# Patient Record
Sex: FEMALE | Race: WHITE | NOT HISPANIC OR LATINO | Employment: OTHER | URBAN - METROPOLITAN AREA
[De-identification: names, ages, dates, MRNs, and addresses within clinical notes are randomized per-mention and may not be internally consistent; named-entity substitution may affect disease eponyms.]

---

## 2023-05-25 LAB
ANION GAP IN SER/PLAS: 12 MMOL/L (ref 10–20)
CALCIUM (MG/DL) IN SER/PLAS: 8.6 MG/DL (ref 8.6–10.3)
CARBON DIOXIDE, TOTAL (MMOL/L) IN SER/PLAS: 28 MMOL/L (ref 21–32)
CHLORIDE (MMOL/L) IN SER/PLAS: 98 MMOL/L (ref 98–107)
CREATININE (MG/DL) IN SER/PLAS: 2.09 MG/DL (ref 0.5–1.05)
GFR FEMALE: 30 ML/MIN/1.73M2
GLUCOSE (MG/DL) IN SER/PLAS: 449 MG/DL (ref 74–99)
POTASSIUM (MMOL/L) IN SER/PLAS: 4.1 MMOL/L (ref 3.5–5.3)
SODIUM (MMOL/L) IN SER/PLAS: 134 MMOL/L (ref 136–145)
UREA NITROGEN (MG/DL) IN SER/PLAS: 15 MG/DL (ref 6–23)

## 2023-06-13 ENCOUNTER — HOSPITAL ENCOUNTER (OUTPATIENT)
Dept: DATA CONVERSION | Facility: HOSPITAL | Age: 43
End: 2023-06-13
Attending: SURGERY | Admitting: SURGERY
Payer: COMMERCIAL

## 2023-06-13 DIAGNOSIS — E55.9 VITAMIN D DEFICIENCY, UNSPECIFIED: ICD-10-CM

## 2023-06-13 DIAGNOSIS — N18.6 END STAGE RENAL DISEASE (MULTI): ICD-10-CM

## 2023-06-13 DIAGNOSIS — Z87.81 PERSONAL HISTORY OF (HEALED) TRAUMATIC FRACTURE: ICD-10-CM

## 2023-06-13 DIAGNOSIS — Z87.891 PERSONAL HISTORY OF NICOTINE DEPENDENCE: ICD-10-CM

## 2023-06-13 DIAGNOSIS — E10.21 TYPE 1 DIABETES MELLITUS WITH DIABETIC NEPHROPATHY (MULTI): ICD-10-CM

## 2023-06-13 DIAGNOSIS — Z99.2 DEPENDENCE ON RENAL DIALYSIS (CMS-HCC): ICD-10-CM

## 2023-06-13 DIAGNOSIS — I70.25 ATHEROSCLEROSIS OF NATIVE ARTERIES OF OTHER EXTREMITIES WITH ULCERATION (MULTI): ICD-10-CM

## 2023-06-13 DIAGNOSIS — Z90.49 ACQUIRED ABSENCE OF OTHER SPECIFIED PARTS OF DIGESTIVE TRACT: ICD-10-CM

## 2023-06-13 DIAGNOSIS — E10.22 TYPE 1 DIABETES MELLITUS WITH DIABETIC CHRONIC KIDNEY DISEASE (MULTI): ICD-10-CM

## 2023-06-13 DIAGNOSIS — M86.9 OSTEOMYELITIS, UNSPECIFIED (MULTI): ICD-10-CM

## 2023-06-13 DIAGNOSIS — E10.40 TYPE 1 DIABETES MELLITUS WITH DIABETIC NEUROPATHY, UNSPECIFIED (MULTI): ICD-10-CM

## 2023-06-13 DIAGNOSIS — I13.11 HYPERTENSIVE HEART AND CHRONIC KIDNEY DISEASE WITHOUT HEART FAILURE, WITH STAGE 5 CHRONIC KIDNEY DISEASE, OR END STAGE RENAL DISEASE (MULTI): ICD-10-CM

## 2023-06-13 DIAGNOSIS — E87.6 HYPOKALEMIA: ICD-10-CM

## 2023-06-13 DIAGNOSIS — Z95.820 PERIPHERAL VASCULAR ANGIOPLASTY STATUS WITH IMPLANTS AND GRAFTS: ICD-10-CM

## 2023-06-13 DIAGNOSIS — D69.6 THROMBOCYTOPENIA, UNSPECIFIED (CMS-HCC): ICD-10-CM

## 2023-06-13 DIAGNOSIS — Z86.718 PERSONAL HISTORY OF OTHER VENOUS THROMBOSIS AND EMBOLISM: ICD-10-CM

## 2023-06-13 DIAGNOSIS — Z79.84 LONG TERM (CURRENT) USE OF ORAL HYPOGLYCEMIC DRUGS: ICD-10-CM

## 2023-06-13 DIAGNOSIS — K21.9 GASTRO-ESOPHAGEAL REFLUX DISEASE WITHOUT ESOPHAGITIS: ICD-10-CM

## 2023-06-13 DIAGNOSIS — D63.1 ANEMIA IN CHRONIC KIDNEY DISEASE (CODE): ICD-10-CM

## 2023-06-13 DIAGNOSIS — E78.5 HYPERLIPIDEMIA, UNSPECIFIED: ICD-10-CM

## 2023-06-13 DIAGNOSIS — Z79.4 LONG TERM (CURRENT) USE OF INSULIN (MULTI): ICD-10-CM

## 2023-06-13 LAB — POCT GLUCOSE: 139 MG/DL (ref 74–99)

## 2023-07-24 LAB
ANION GAP IN SER/PLAS: 11 MMOL/L (ref 10–20)
CALCIUM (MG/DL) IN SER/PLAS: 9.3 MG/DL (ref 8.6–10.3)
CARBON DIOXIDE, TOTAL (MMOL/L) IN SER/PLAS: 29 MMOL/L (ref 21–32)
CHLORIDE (MMOL/L) IN SER/PLAS: 99 MMOL/L (ref 98–107)
CREATININE (MG/DL) IN SER/PLAS: 2.58 MG/DL (ref 0.5–1.05)
GFR FEMALE: 23 ML/MIN/1.73M2
GLUCOSE (MG/DL) IN SER/PLAS: 265 MG/DL (ref 74–99)
POTASSIUM (MMOL/L) IN SER/PLAS: 3.5 MMOL/L (ref 3.5–5.3)
SODIUM (MMOL/L) IN SER/PLAS: 135 MMOL/L (ref 136–145)
UREA NITROGEN (MG/DL) IN SER/PLAS: 15 MG/DL (ref 6–23)

## 2023-07-26 LAB
BLOOD UREA NITROGEN: 15 MG/DL (ref 6–23)
BLOOD UREA NITROGEN: NORMAL
COLLECTION DURATION OF URINE: 24 HR
COLLECTION DURATION OF URINE: NORMAL
CREATININE (MG/24HR) IN 24 HOUR URINE: 0.71 G/24H (ref 0.67–1.59)
CREATININE (MG/24HR) IN 24 HOUR URINE: NORMAL
CREATININE (MG/DL) IN URINE: 74.7 MG/DL (ref 20–320)
CREATININE (MG/DL) IN URINE: NORMAL
Lab: 9 ML/MIN (ref 70–125)
Lab: NORMAL
Lab: NORMAL G/G CREAT
UREA NITROGEN (G/24 HR) 24 HOUR URINE: 2 G/24H (ref 7–20)
UREA NITROGEN (G/24 HR) 24 HOUR URINE: NORMAL
UREA NITROGEN (MG/DL) IN URINE: 208 MG/DL
UREA NITROGEN (MG/DL) IN URINE: NORMAL
VOLUME OF URINE: 950 ML
VOLUME OF URINE: NORMAL

## 2023-07-27 LAB
CYSTATIN C: 2.63 MG/L (ref 0.63–1.03)
EGFR BY CYSTATIN C: 21 ML/MIN/BSA

## 2023-08-01 PROBLEM — S92.919A TOE FRACTURE: Status: ACTIVE | Noted: 2023-08-01

## 2023-08-01 PROBLEM — I70.229 CRITICAL LOWER LIMB ISCHEMIA (MULTI): Status: ACTIVE | Noted: 2023-08-01

## 2023-08-01 PROBLEM — L03.119 CELLULITIS OF AXILLA: Status: ACTIVE | Noted: 2023-08-01

## 2023-08-01 PROBLEM — R60.0 LOWER EXTREMITY EDEMA: Status: ACTIVE | Noted: 2023-08-01

## 2023-08-01 PROBLEM — E53.8 FOLATE DEFICIENCY: Status: ACTIVE | Noted: 2023-08-01

## 2023-08-01 PROBLEM — E78.5 DYSLIPIDEMIA: Status: ACTIVE | Noted: 2023-08-01

## 2023-08-01 PROBLEM — S42.202A CLOSED FRACTURE OF LEFT PROXIMAL HUMERUS: Status: ACTIVE | Noted: 2023-08-01

## 2023-08-01 PROBLEM — M54.9 CHRONIC BACK PAIN: Status: ACTIVE | Noted: 2023-08-01

## 2023-08-01 PROBLEM — R93.1 AGATSTON CORONARY ARTERY CALCIUM SCORE LESS THAN 100: Status: ACTIVE | Noted: 2023-08-01

## 2023-08-01 PROBLEM — E10.9 TYPE 1 DIABETES MELLITUS (MULTI): Status: ACTIVE | Noted: 2023-08-01

## 2023-08-01 PROBLEM — M25.521 RIGHT ELBOW PAIN: Status: ACTIVE | Noted: 2023-08-01

## 2023-08-01 PROBLEM — D63.1 ANEMIA IN STAGE 5 CHRONIC KIDNEY DISEASE, NOT ON CHRONIC DIALYSIS (MULTI): Status: ACTIVE | Noted: 2023-08-01

## 2023-08-01 PROBLEM — E10.65 UNCONTROLLED TYPE 1 DIABETES MELLITUS WITH HYPERGLYCEMIA (MULTI): Status: ACTIVE | Noted: 2023-08-01

## 2023-08-01 PROBLEM — L03.119 CELLULITIS, LEG: Status: ACTIVE | Noted: 2023-08-01

## 2023-08-01 PROBLEM — I73.9 INTERMITTENT CLAUDICATION (CMS-HCC): Status: ACTIVE | Noted: 2023-08-01

## 2023-08-01 PROBLEM — E10.649 HYPOGLYCEMIA DUE TO TYPE 1 DIABETES MELLITUS (MULTI): Status: ACTIVE | Noted: 2023-08-01

## 2023-08-01 PROBLEM — I77.0 AVF (ARTERIOVENOUS FISTULA) (CMS-HCC): Status: ACTIVE | Noted: 2023-08-01

## 2023-08-01 PROBLEM — S42.409A HUMERUS DISTAL FRACTURE: Status: ACTIVE | Noted: 2023-08-01

## 2023-08-01 PROBLEM — E55.9 VITAMIN D DEFICIENCY: Status: ACTIVE | Noted: 2023-08-01

## 2023-08-01 PROBLEM — M86.10 OSTEOMYELITIS, ACUTE (MULTI): Status: ACTIVE | Noted: 2023-08-01

## 2023-08-01 PROBLEM — N91.2 AMENORRHEA: Status: ACTIVE | Noted: 2023-08-01

## 2023-08-01 PROBLEM — E11.40 DIABETIC NEUROPATHY (MULTI): Status: ACTIVE | Noted: 2023-08-01
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
## 2023-08-01 PROBLEM — R80.9: Status: ACTIVE | Noted: 2023-08-01

## 2023-08-01 PROBLEM — Z99.2 ESRD (END STAGE RENAL DISEASE) ON DIALYSIS (MULTI): Status: ACTIVE | Noted: 2023-08-01

## 2023-08-01 PROBLEM — N18.4 CHRONIC KIDNEY DISEASE, STAGE IV (SEVERE) (MULTI): Status: ACTIVE | Noted: 2023-08-01

## 2023-08-01 PROBLEM — I73.9 PERIPHERAL ARTERY DISEASE (CMS-HCC): Status: ACTIVE | Noted: 2023-08-01

## 2023-08-01 PROBLEM — K21.9 ACID REFLUX: Status: ACTIVE | Noted: 2023-08-01

## 2023-08-01 PROBLEM — N18.5 ANEMIA IN STAGE 5 CHRONIC KIDNEY DISEASE, NOT ON CHRONIC DIALYSIS (MULTI): Status: ACTIVE | Noted: 2023-08-01

## 2023-08-01 PROBLEM — I10 BENIGN ESSENTIAL HYPERTENSION: Status: ACTIVE | Noted: 2023-08-01

## 2023-08-01 PROBLEM — T82.868A: Status: ACTIVE | Noted: 2023-08-01

## 2023-08-01 PROBLEM — N18.6 ESRD (END STAGE RENAL DISEASE) ON DIALYSIS (MULTI): Status: ACTIVE | Noted: 2023-08-01

## 2023-08-01 PROBLEM — G89.29 CHRONIC BACK PAIN: Status: ACTIVE | Noted: 2023-08-01

## 2023-08-01 PROBLEM — E87.6 HYPOKALEMIA: Status: ACTIVE | Noted: 2023-08-01

## 2023-08-01 PROBLEM — R06.09 DOE (DYSPNEA ON EXERTION): Status: ACTIVE | Noted: 2023-08-01

## 2023-08-01 PROBLEM — R19.7 DIARRHEA: Status: ACTIVE | Noted: 2023-08-01

## 2023-09-07 VITALS
BODY MASS INDEX: 28.93 KG/M2 | SYSTOLIC BLOOD PRESSURE: 132 MMHG | DIASTOLIC BLOOD PRESSURE: 72 MMHG | RESPIRATION RATE: 18 BRPM | HEIGHT: 61 IN | TEMPERATURE: 97.7 F | WEIGHT: 153.22 LBS | HEART RATE: 69 BPM

## 2023-09-19 ENCOUNTER — LAB (OUTPATIENT)
Dept: LAB | Facility: LAB | Age: 43
End: 2023-09-19
Payer: COMMERCIAL

## 2023-09-19 ENCOUNTER — OFFICE VISIT (OUTPATIENT)
Dept: PRIMARY CARE | Facility: CLINIC | Age: 43
End: 2023-09-19
Payer: COMMERCIAL

## 2023-09-19 VITALS
SYSTOLIC BLOOD PRESSURE: 158 MMHG | HEIGHT: 67 IN | DIASTOLIC BLOOD PRESSURE: 90 MMHG | WEIGHT: 158 LBS | BODY MASS INDEX: 24.8 KG/M2

## 2023-09-19 DIAGNOSIS — K59.1 FUNCTIONAL DIARRHEA: Primary | ICD-10-CM

## 2023-09-19 DIAGNOSIS — A08.11 ACUTE GASTROENTEROPATHY DUE TO NORWALK AGENT: ICD-10-CM

## 2023-09-19 DIAGNOSIS — K59.1 FUNCTIONAL DIARRHEA: ICD-10-CM

## 2023-09-19 LAB
ALANINE AMINOTRANSFERASE (SGPT) (U/L) IN SER/PLAS: 14 U/L (ref 7–45)
ALBUMIN (G/DL) IN SER/PLAS: 3.7 G/DL (ref 3.4–5)
ALKALINE PHOSPHATASE (U/L) IN SER/PLAS: 136 U/L (ref 33–110)
ANION GAP IN SER/PLAS: 15 MMOL/L (ref 10–20)
ASPARTATE AMINOTRANSFERASE (SGOT) (U/L) IN SER/PLAS: 29 U/L (ref 9–39)
BILIRUBIN TOTAL (MG/DL) IN SER/PLAS: 0.4 MG/DL (ref 0–1.2)
C REACTIVE PROTEIN (MG/L) IN SER/PLAS: 0.39 MG/DL
CALCIUM (MG/DL) IN SER/PLAS: 9 MG/DL (ref 8.6–10.6)
CARBON DIOXIDE, TOTAL (MMOL/L) IN SER/PLAS: 25 MMOL/L (ref 21–32)
CHLORIDE (MMOL/L) IN SER/PLAS: 105 MMOL/L (ref 98–107)
CREATININE (MG/DL) IN SER/PLAS: 2.1 MG/DL (ref 0.5–1.05)
GFR FEMALE: 29 ML/MIN/1.73M2
GLUCOSE (MG/DL) IN SER/PLAS: 86 MG/DL (ref 74–99)
MAGNESIUM (MG/DL) IN SER/PLAS: 2.92 MG/DL (ref 1.6–2.4)
POTASSIUM (MMOL/L) IN SER/PLAS: 4.5 MMOL/L (ref 3.5–5.3)
PROTEIN TOTAL: 6.5 G/DL (ref 6.4–8.2)
SEDIMENTATION RATE, ERYTHROCYTE: 10 MM/H (ref 0–20)
SODIUM (MMOL/L) IN SER/PLAS: 140 MMOL/L (ref 136–145)
UREA NITROGEN (MG/DL) IN SER/PLAS: 17 MG/DL (ref 6–23)

## 2023-09-19 PROCEDURE — 99214 OFFICE O/P EST MOD 30 MIN: CPT | Performed by: STUDENT IN AN ORGANIZED HEALTH CARE EDUCATION/TRAINING PROGRAM

## 2023-09-19 PROCEDURE — 36415 COLL VENOUS BLD VENIPUNCTURE: CPT

## 2023-09-19 PROCEDURE — 3061F NEG MICROALBUMINURIA REV: CPT | Performed by: STUDENT IN AN ORGANIZED HEALTH CARE EDUCATION/TRAINING PROGRAM

## 2023-09-19 PROCEDURE — 3066F NEPHROPATHY DOC TX: CPT | Performed by: STUDENT IN AN ORGANIZED HEALTH CARE EDUCATION/TRAINING PROGRAM

## 2023-09-19 PROCEDURE — 86140 C-REACTIVE PROTEIN: CPT

## 2023-09-19 PROCEDURE — 85652 RBC SED RATE AUTOMATED: CPT

## 2023-09-19 PROCEDURE — 83735 ASSAY OF MAGNESIUM: CPT

## 2023-09-19 PROCEDURE — 3080F DIAST BP >= 90 MM HG: CPT | Performed by: STUDENT IN AN ORGANIZED HEALTH CARE EDUCATION/TRAINING PROGRAM

## 2023-09-19 PROCEDURE — 3077F SYST BP >= 140 MM HG: CPT | Performed by: STUDENT IN AN ORGANIZED HEALTH CARE EDUCATION/TRAINING PROGRAM

## 2023-09-19 PROCEDURE — 3044F HG A1C LEVEL LT 7.0%: CPT | Performed by: STUDENT IN AN ORGANIZED HEALTH CARE EDUCATION/TRAINING PROGRAM

## 2023-09-19 PROCEDURE — 80053 COMPREHEN METABOLIC PANEL: CPT

## 2023-09-19 ASSESSMENT — ENCOUNTER SYMPTOMS
DIARRHEA: 1
CONSTITUTIONAL NEGATIVE: 1
RESPIRATORY NEGATIVE: 1
NEUROLOGICAL NEGATIVE: 1
PSYCHIATRIC NEGATIVE: 1
ABDOMINAL PAIN: 1
CARDIOVASCULAR NEGATIVE: 1
MUSCULOSKELETAL NEGATIVE: 1

## 2023-09-19 NOTE — PROGRESS NOTES
"Subjective   Patient ID: Lien Moy is a 43 y.o. female who presents for GI Problem (Loose stools, x 1 month).    Patient seen on sick visit.  She regards that she recently went on a vacation, and was recently pulled off dialysis.  Since this time, she has been having episodes of diffuse amounts of diarrhea.  States that it is watery in nature, and can be up to 4-5-6 times a day.  She reports no smell to the diarrhea, no recent changes of her medications.  And can come and go.  Was previously recommended a colonoscopy as well as a EGD which she did not follow-up with.  States no additional issues or concerns.         Review of Systems   Constitutional: Negative.    HENT: Negative.     Respiratory: Negative.     Cardiovascular: Negative.    Gastrointestinal:  Positive for abdominal pain and diarrhea.   Musculoskeletal: Negative.    Neurological: Negative.    Psychiatric/Behavioral: Negative.         Objective   /90   Ht 1.702 m (5' 7\")   Wt 71.7 kg (158 lb)   BMI 24.75 kg/m²     Physical Exam  Constitutional:       General: She is not in acute distress.     Appearance: She is not ill-appearing.   HENT:      Mouth/Throat:      Mouth: Mucous membranes are moist.      Pharynx: Oropharynx is clear. No oropharyngeal exudate or posterior oropharyngeal erythema.   Eyes:      Pupils: Pupils are equal, round, and reactive to light.   Cardiovascular:      Rate and Rhythm: Normal rate and regular rhythm.      Heart sounds: No murmur heard.  Pulmonary:      Effort: Pulmonary effort is normal. No respiratory distress.      Breath sounds: No wheezing.   Abdominal:      General: Abdomen is flat. Bowel sounds are normal. There is no distension.      Palpations: Abdomen is soft.      Tenderness: There is no abdominal tenderness.   Musculoskeletal:         General: No tenderness. Normal range of motion.   Skin:     General: Skin is warm and dry.   Neurological:      General: No focal deficit present.      Mental Status: " She is alert and oriented to person, place, and time. Mental status is at baseline.   Psychiatric:         Mood and Affect: Mood normal.         Assessment/Plan   Problem List Items Addressed This Visit       Diarrhea - Primary    Relevant Orders    Comprehensive Metabolic Panel    C. difficile, PCR    Ova/Para + Giardia/Cryptosporidium Antigen    Stool Pathogen Panel, PCR    Magnesium    Referral to Gastroenterology    Sedimentation Rate    C-Reactive Protein     Other Visit Diagnoses       Acute gastroenteropathy due to [place] agent        Relevant Orders    Stool Pathogen Panel, PCR          Patient seen on sick evaluation    #Gastritis  #Diarrhea  #Concern for IBS  Patient presents with potential signs and symptoms of infectious diarrhea, check C. difficile, PCR panel, ova and parasites  Recommend checking CBC CMP magnesium as well as ESR and sed rate to rule out underlying causes  Advised use of Imodium or over-the-counter medications if work-up is negative and infectious etiologies ruled out  Could be underlying functional diarrhea, could consider short use of low Bentyl, advise GI follow-up for potential EGD or colonoscopy  Patient agreeable with this plan will call if symptoms worsen or do not improve    Return to care in 3 to 4 months or as needed

## 2023-09-30 NOTE — H&P
History of Present Illness:   Pregnant/Lactating:  ·  Are You Pregnant no   ·  Are You Currently Breastfeeding no     History Present Illness:  Reason for surgery: right upper extremity av g   HPI:    32yo R-handed F w/ ESRD on HD since 1/2022, MWF @ [facility] (Dr. Rivas) presenting today 6/13 for creation of  a RUE AV graft.  Had a L axillo-axillary looped AVG placed by Dr. Mancia on 3/22/22. Thrombosed and required perc thrombectomy and PTA of the venous anastomosis on 4/12/22, but thrombosed 2 days after and she underwent an open surgical thrombectomy on 4/14/2022 .  Pre-op studies demonstrate a complete R palmar arch, a R brachial artery of diameters 2.8-3mm, R radial artery of 1.4-1.9mm.  Planning for RUE axillo-axillary loop graft.  Has history of prescribed narcotic addiction and taking daily Suboxone.  No other concerns today.  --------------------------------------------------------------------  Medical history:  -ESRD on HD; HTN; HLD; GERD; LLE DVT 2005.  Surgical history:  -Cholecystectomy; ORIF distal R humeral fracture and ulnar nerve transposition 2017; partial R 5th toe amp for osteo 2018;   -L axillo-axillary looped AV graft (3/22/22); Percutaneous thrombectomy of AV graft (4/22); Open mechanical thrombectomy of AV graft (4/22)  Family history:  -Mother: PAD and mesenteric artery stenosis  Social history:  -Former smoker - quit 5/2021; 1/2 PPDx20 years.  Allergies:  -Penicillin (itching)               Allergies:        Allergies:  ·  penicillin : Itching (Severe), Hives/Urticaria (Severe)  ·  Nyquil  Cold and Flu: Itching  ·  Benedryl  Allergy Sinus: Unknown  ·  Tape  - Adhesive, Bandaids, Paper: Unknown    Home Medication Review:   Home Medications Reviewed: yes     Impression/Procedure:   ·  Impression and Planned Procedure: right upper extremity arteriovenous graft creation for ESRD HD access       ERAS (Enhanced Recovery After Surgery):  ·  ERAS Patient: no     Review of Systems:   Review  of Systems:  Constitutional: NEGATIVE: Fever, Chills     Eyes: NEGATIVE: Drainage     ENMT: NEGATIVE: Ear Pain     Respiratory: NEGATIVE: Productive Cough     Cardiac: NEGATIVE: Chest Pain, Dyspnea on Exertion     Gastrointestinal: NEGATIVE: Vomiting     Genitourinary: NEGATIVE: Dysuria     Musculoskeletal: NEGATIVE: Pain     Neurological: NEGATIVE: Confusion     Psychiatric: NEGATIVE: Anxiety     Skin: NEGATIVE: Pain     Endocrine: NEGATIVE: Cold Intolerance     Hematologic/Lymph: NEGATIVE: Bruising     Allergic/Immunologic: NEGATIVE: Itchy/ Teary Eyes     All Other Systems: All other systems reviewed and  are negative       Vital Signs:  Temperature C: 36.5 degrees C   Temperature F: 97.7 degrees F   Heart Rate: 69 beats per minute   Respiratory Rate: 18 breath per minute   Blood Pressure Systolic: 132 mm/Hg   Blood Pressure Diastolic: 72 mm/Hg     Physical Exam by System:    Constitutional: Non-toxic and well-appearing.   Eyes: EOMI.   ENMT: MMM.   Head/Neck: AT/NC.   Respiratory/Thorax: nonlabored breathing on room  air   Cardiovascular: RRR   Musculoskeletal: MAEx4. FROM   Extremities: LUE w/ prior axillo-axillary graft.  Palpable R radial pulse.   Neurological: No focal deficits.   Skin: No rash to exposed skin.     Consent:   COVID-19 Consent:  ·  COVID-19 Risk Consent Surgeon has reviewed key risks related to the risk of sergio COVID-19 and if they contract COVID-19 what the risks are.     Attestation:   Note Completion:  I am a:  Resident/Fellow   Attending Attestation I saw and evaluated the patient.  I personally obtained the key and critical portions of the history and physical exam or was physically present for key and  critical portions performed by the resident/fellow. I reviewed the resident/fellow?s documentation and discussed the patient with the resident/fellow.  I agree with the resident/fellow?s medical decision making as documented in the note.     I personally evaluated the patient on  13-Jun-2023         Electronic Signatures:  Oliver Shane (Resident))  (Signed 13-Jun-2023 10:16)   Authored: History of Present Illness, Allergies, Home  Medication Review, Impression/Procedure, ERAS, Review of Systems, Physical Exam, Consent, Note Completion  Jennifer Mancia)  (Signed 13-Jun-2023 17:31)   Authored: Note Completion   Co-Signer: History of Present Illness, Allergies, Home Medication Review, Impression/Procedure, ERAS, Review of Systems, Physical Exam,  Consent, Note Completion      Last Updated: 13-Jun-2023 17:31 by Jennifer Mancia)

## 2023-10-02 NOTE — OP NOTE
PROCEDURE DETAILS    Preoperative Diagnosis:  End stage renal disease  Postoperative Diagnosis:  End stage renal disease  Surgeon: Jennifer Mancia MD  Resident/Fellow/Other Assistant: Oliver Shane MD    Procedure:  R axillo-axillary AVG  Anesthesia: GETA  Estimated Blood Loss: 100cc  Findings: Small brachial artery (3.0 mm diam). Slightly larger R axillary artery (4.0 mm diam).   Specimens(s) Collected: no,     Complications: None  Patient Returned To/Condition: PACU, extubated, stable.  Thrill over RUE AVG.  R radial pulse palpable.       Dictation Job Number: [id number]                            Attestation:   Note Completion:  Attending Attestation I was present for the entire procedure    I am a: Resident/Fellow         Electronic Signatures:  Oliver Shane (Resident))  (Signed 13-Jun-2023 09:56)   Authored: Post-Operative Note, Chart Review, Note Completion  Jennifer Mancia)  (Signed 13-Jun-2023 17:33)   Authored: Post-Operative Note, Chart Review, Note Completion   Co-Signer: Post-Operative Note, Chart Review, Note Completion      Last Updated: 13-Jun-2023 17:33 by Jennifer Mancia)

## 2023-10-23 ENCOUNTER — LAB (OUTPATIENT)
Dept: LAB | Facility: LAB | Age: 43
End: 2023-10-23
Payer: COMMERCIAL

## 2023-10-23 DIAGNOSIS — N18.5 CHRONIC KIDNEY DISEASE, STAGE 5 (MULTI): Primary | ICD-10-CM

## 2023-10-23 DIAGNOSIS — N18.6 END STAGE RENAL DISEASE (MULTI): ICD-10-CM

## 2023-10-23 LAB
25(OH)D3 SERPL-MCNC: 31 NG/ML (ref 30–100)
ALBUMIN SERPL BCP-MCNC: 4 G/DL (ref 3.4–5)
ANION GAP SERPL CALC-SCNC: 10 MMOL/L (ref 10–20)
BUN SERPL-MCNC: 14 MG/DL (ref 6–23)
BUN SERPL-MCNC: 14 MG/DL (ref 6–23)
CALCIUM SERPL-MCNC: 9 MG/DL (ref 8.6–10.3)
CHLORIDE SERPL-SCNC: 103 MMOL/L (ref 98–107)
CO2 SERPL-SCNC: 29 MMOL/L (ref 21–32)
COLLECT DURATION TIME SPEC: 24 HRS
CREAT 24H UR-MCNC: 32.4 MG/DL (ref 20–320)
CREAT 24H UR-MRATE: 0.55 G/24 H
CREAT SERPL-MCNC: 2.26 MG/DL (ref 0.5–1.05)
GFR SERPL CREATININE-BSD FRML MDRD: 27 ML/MIN/1.73M*2
GLUCOSE SERPL-MCNC: 171 MG/DL (ref 74–99)
IRON SATN MFR SERPL: 34 % (ref 25–45)
IRON SERPL-MCNC: 73 UG/DL (ref 35–150)
Lab: 11 ML/MIN (ref 70–125)
PHOSPHATE SERPL-MCNC: 3 MG/DL (ref 2.5–4.9)
POTASSIUM SERPL-SCNC: 3.8 MMOL/L (ref 3.5–5.3)
PTH-INTACT SERPL-MCNC: 150.2 PG/ML (ref 18.5–88)
SODIUM SERPL-SCNC: 138 MMOL/L (ref 136–145)
SPECIMEN VOL 24H UR: 1700 ML
TIBC SERPL-MCNC: 217 UG/DL (ref 240–445)
UIBC SERPL-MCNC: 144 UG/DL (ref 110–370)
UUN 24H UR-MCNC: 133 MG/DL
UUN 24H UR-MCNC: 133 MG/DL
UUN 24H UR-MRATE: 2.3 G/24H (ref 7–20)
UUN 24H UR-MRATE: 2.3 G/24H (ref 7–20)

## 2023-10-23 PROCEDURE — 36415 COLL VENOUS BLD VENIPUNCTURE: CPT

## 2023-10-23 PROCEDURE — 82306 VITAMIN D 25 HYDROXY: CPT

## 2023-10-23 PROCEDURE — 83540 ASSAY OF IRON: CPT

## 2023-10-23 PROCEDURE — 83550 IRON BINDING TEST: CPT

## 2023-10-23 PROCEDURE — 84540 ASSAY OF URINE/UREA-N: CPT

## 2023-10-23 PROCEDURE — 84520 ASSAY OF UREA NITROGEN: CPT

## 2023-10-23 PROCEDURE — 81050 URINALYSIS VOLUME MEASURE: CPT

## 2023-10-23 PROCEDURE — 82728 ASSAY OF FERRITIN: CPT

## 2023-10-23 PROCEDURE — 83970 ASSAY OF PARATHORMONE: CPT

## 2023-10-23 PROCEDURE — 84545 UREA-N CLEARANCE TEST: CPT

## 2023-10-23 PROCEDURE — 82570 ASSAY OF URINE CREATININE: CPT

## 2023-10-23 PROCEDURE — 80069 RENAL FUNCTION PANEL: CPT

## 2023-10-24 LAB — FERRITIN SERPL-MCNC: 969 NG/ML (ref 8–150)

## 2023-10-25 ENCOUNTER — APPOINTMENT (OUTPATIENT)
Dept: ENDOCRINOLOGY | Facility: CLINIC | Age: 43
End: 2023-10-25
Payer: COMMERCIAL

## 2023-10-25 NOTE — PROGRESS NOTES
Subjective   Lien Moy is a 43 y.o. female presents today for a follow up of DM Type 1. Initial diagnosis with diabetes was age 19.   Known complications include: HPL, CKD, HTN    Last visit with me 6/2023  A1c today **%.  Previous A1c 5.8% in 6/2023. Discussed this is likely falsely low due to her hypoglycemia and renal disease    Since last visit, she continues dialysis on Monday Wednesday Friday 545am-915am  Dialysis graft failed so she had to have a port placed.   Had surgery for new access to right upper arm.   Can be accessed after July 4th.   After dialysis, she feels sugar is perfects  She feels sugars are high on dialysis.     Prior to hospitalization:  Basaglar 7 units twice daily  Lispro 5 units with meals plus sliding scale   If Blood Sugar is < 150 add 0 units  If Blood Sugar is 151-200 add 1 units  If Blood Sugar is 201-250 add 2 units  If Blood Sugar is 251-300 add 3 units  If Blood Sugar is 301-350 add 4 units  If Blood Sugar is 351-400 add 5 units  If Blood Sugar is 401-450 add 6 units>450, call the office    Current regimen post hospital discharge:   Basaglar 16 units daily in afternoon (self adjusted, prescribed 13 units)  Lispro 3-6 units after meals plus sliding scale if needed with meals  If Blood Sugar is < 150 add 0 units  If Blood Sugar is 151-200 add 0 units  If Blood Sugar is 201-250 add 1 units  If Blood Sugar is 251-300 add 2 units  If Blood Sugar is 301-350 add 3 units  If Blood Sugar is 351-400 add 4 units  >400, call the office        Patient is using continuous glucose monitor ***  The patient is/is not*** currently checking the blood glucose *** times per day     Hypoglycemia frequency: ***  Hypoglycemia awareness: ***     Regarding symptoms of hyperglycemia, the patient is ***not experiencing any symptoms such as ***polyuria, polydipsia, nocturia or rapid weight loss or blurry vision. The patient comes into the office today ***.      Last foot exam: ***  Last eye exam:  Last  "urine albumin:  Last LDL:     ROS  General: no fever, chills or acute changes in weight in the last 6 months  Skin: no rashes, pruritis or dry skin  Cardiac: denies chest pain, heart palpitations or orthopnea  Pulmonary: denies wheezing, productive cough or exertional dyspnea      Objective    Physical Exam  There were no vitals taken for this visit.  General: not in acute distress, cooperative   Respiratory: normal respiratory effort  Musculoskeletal: normal gait       Current Outpatient Medications:     acetone, urine, test (TRUEplus Ketone) strip, test urine for ketones if blood sugar >250, with illness, or if pump malfunctions, Disp: , Rfl:     aspirin 81 mg EC tablet, Take 1 tablet (81 mg) by mouth once daily., Disp: , Rfl:     Basaglar KwikPen U-100 Insulin 100 unit/mL (3 mL) pen, ADMINISTER UP TO 15 UNITS UNDER THE SKIN DAILY, Disp: , Rfl:     BD Ultra-Fine Short Pen Needle 31 gauge x 5/16\" needle, USE WITH INSULIN PENS FIVE TIMES DAILY, Disp: , Rfl:     buprenorphine-naloxone (Suboxone) 8-2 mg SL tablet, DISSOLVE ONE TABLET UNDER THE TONGUE TWICE DAILY, Disp: , Rfl:     cetirizine (ZyrTEC) 10 mg tablet, Take by mouth., Disp: , Rfl:     DULoxetine (Cymbalta) 20 mg DR capsule, Take by mouth., Disp: , Rfl:     ergocalciferol (Vitamin D-2) 1.25 MG (81486 UT) capsule, Take 1 capsule (1,250 mcg) by mouth 1 (one) time per week., Disp: , Rfl:     ethyl chloride 100 % spray, USE TOPICALLY AS DIRECTED., Disp: , Rfl:     famotidine (Pepcid) 10 mg tablet, Take by mouth., Disp: , Rfl:     fluticasone (Flonase) 50 mcg/actuation nasal spray, SHAKE LIQUID AND USE 1 SPRAY IN EACH NOSTRIL EVERY DAY FOR 10 DAYS AS DIRECTED, Disp: , Rfl:     gabapentin (Neurontin) 300 mg capsule, TAKE 1 CAPSULE BY MOUTH AFTER DIALYSIS ON EVERY DIALYSIS DAY, Disp: , Rfl:     glucagon 1 mg injection, 1 mg., Disp: , Rfl:     glucose 4 gram chewable tablet, Chew once daily as needed., Disp: , Rfl:     Hibiclens 4 % external liquid, USE AS DIRECTED " FOR PREOPERATIVE SHOWER, Disp: , Rfl:     hydrOXYzine HCL (Atarax) 25 mg tablet, Take by mouth., Disp: , Rfl:     insulin aspart (NovoLOG) 100 unit/mL injection, Inject under the skin., Disp: , Rfl:     insulin lispro (HumaLOG) 100 unit/mL injection, Inject under the skin., Disp: , Rfl:     isosorbide mononitrate ER (Imdur) 30 mg 24 hr tablet, Take 1 tablet (30 mg) by mouth once daily., Disp: , Rfl:     ketoconazole (NIZOral) 2 % cream, Apply topically once daily., Disp: , Rfl:     lidocaine-prilocaine (Emla) 2.5-2.5 % cream, APPLY TOPICALLY TO THE AFFECTED AREA 1 HOUR BEFORE PROCEDURE AS DIRECTED, Disp: , Rfl:     NovoLOG FlexPen U-100 Insulin 100 unit/mL (3 mL) pen, INJECT 3 UNITS WITH MEALS PLUS SLIDING SCALE. GIVE ONE UNIT FOR EVERY 50 OVER 150. MAX DAILY DOSE OF 55 UNITS, Disp: , Rfl:     ofloxacin (Ocuflox) 0.3 % ophthalmic solution, INSTILL 1 DROP IN LEFT EYE FOUR TIMES DAILY, Disp: , Rfl:     oxyCODONE (Roxicodone) 5 mg immediate release tablet, TAKE 1 TABLET BY MOUTH EVERY 4 HOURS AS NEEDED FOR POST OP PAIN, Disp: , Rfl:     prednisoLONE acetate (Pred-Forte) 1 % ophthalmic suspension, SHAKE LIQUID AND INSTILL 1 DROP IN LEFT EYE FOUR TIMES DAILY, Disp: , Rfl:     Restasis 0.05 % ophthalmic emulsion, , Disp: , Rfl:     Assessment/Plan

## 2023-10-26 ENCOUNTER — APPOINTMENT (OUTPATIENT)
Dept: NEPHROLOGY | Facility: CLINIC | Age: 43
End: 2023-10-26
Payer: COMMERCIAL

## 2023-10-30 ENCOUNTER — OFFICE VISIT (OUTPATIENT)
Dept: NEPHROLOGY | Facility: CLINIC | Age: 43
End: 2023-10-30
Payer: COMMERCIAL

## 2023-10-30 VITALS
WEIGHT: 160 LBS | BODY MASS INDEX: 25.11 KG/M2 | RESPIRATION RATE: 18 BRPM | TEMPERATURE: 97.8 F | DIASTOLIC BLOOD PRESSURE: 106 MMHG | SYSTOLIC BLOOD PRESSURE: 192 MMHG | HEART RATE: 97 BPM | HEIGHT: 67 IN

## 2023-10-30 DIAGNOSIS — Z99.2 ESRD (END STAGE RENAL DISEASE) ON DIALYSIS (MULTI): Primary | ICD-10-CM

## 2023-10-30 DIAGNOSIS — N18.6 ESRD (END STAGE RENAL DISEASE) ON DIALYSIS (MULTI): Primary | ICD-10-CM

## 2023-10-30 PROCEDURE — 3080F DIAST BP >= 90 MM HG: CPT | Performed by: INTERNAL MEDICINE

## 2023-10-30 PROCEDURE — 3077F SYST BP >= 140 MM HG: CPT | Performed by: INTERNAL MEDICINE

## 2023-10-30 PROCEDURE — 3066F NEPHROPATHY DOC TX: CPT | Performed by: INTERNAL MEDICINE

## 2023-10-30 PROCEDURE — 99214 OFFICE O/P EST MOD 30 MIN: CPT | Performed by: INTERNAL MEDICINE

## 2023-10-30 PROCEDURE — 3061F NEG MICROALBUMINURIA REV: CPT | Performed by: INTERNAL MEDICINE

## 2023-10-30 PROCEDURE — 3044F HG A1C LEVEL LT 7.0%: CPT | Performed by: INTERNAL MEDICINE

## 2023-10-30 RX ORDER — CALCITRIOL 0.5 UG/1
0.5 CAPSULE ORAL DAILY
Qty: 30 CAPSULE | Refills: 5 | Status: SHIPPED | OUTPATIENT
Start: 2023-10-30 | End: 2024-04-27

## 2023-10-30 NOTE — PATIENT INSTRUCTIONS
Start Torsemide 40 mg once a day and Calcitriol 0.5 mcg once a day  Check BP at home, goal /80  Call me if you notice any of the uremic symptoms- like loss of appetite, metallic taste in mouth, altered taste to foods, excessive fatigue, involuntary weight loss, itching, excessive daytime drowsiness, chest or abdominal pain, diarrhea, shortness of breath or worsening leg swelling.

## 2023-10-31 DIAGNOSIS — Z99.2 ESRD (END STAGE RENAL DISEASE) ON DIALYSIS (MULTI): ICD-10-CM

## 2023-10-31 DIAGNOSIS — N18.6 ESRD (END STAGE RENAL DISEASE) ON DIALYSIS (MULTI): ICD-10-CM

## 2023-10-31 RX ORDER — TORSEMIDE 20 MG/1
40 TABLET ORAL DAILY
Qty: 360 TABLET | Refills: 0 | Status: SHIPPED | OUTPATIENT
Start: 2023-10-31 | End: 2024-04-28

## 2023-10-31 NOTE — PROGRESS NOTES
"Seen on 10/30/23 at [facility]  Here with Mr Panda  Doing well, has more energy and more appetite per both of them  Has had on and off diarrhea, has been referred to GI for evaluation for IBS as she thinks certain foods trigger it  Denies any other uremic symptoms    RoS negative for all other systems except as noted above.      Visit Vitals  BP (!) 192/106 (BP Location: Right arm, Patient Position: Sitting, BP Cuff Size: Adult)   Pulse 97   Temp 36.6 °C (97.8 °F) (Temporal)   Resp 18   Ht 1.702 m (5' 7\")   Wt 72.6 kg (160 lb)   BMI 25.06 kg/m²   Smoking Status Unknown   BSA 1.85 m²      No distress  HEENT:  moist, no pallor  No edema el LE  Breath sounds el equal, clear  S1 S2 regular, normal, no rub or murmur  Abdomen soft, non tender  AAO x3, non focal      Sodium   Date/Time Value Ref Range Status   10/23/2023 01:17  136 - 145 mmol/L Final   09/19/2023 02:27  136 - 145 mmol/L Final   07/24/2023 09:34  (L) 136 - 145 mmol/L Final   06/06/2023 01:37  136 - 145 mmol/L Final     Potassium   Date/Time Value Ref Range Status   10/23/2023 01:17 PM 3.8 3.5 - 5.3 mmol/L Final   09/19/2023 02:27 PM 4.5 3.5 - 5.3 mmol/L Final     Comment:     MILD HEMOLYSIS DETECTED. The result may be falsely elevated due to  hemolysis or other interferents. Clinical correlation is recommended.  Repeat testing may be considered.   07/24/2023 09:34 AM 3.5 3.5 - 5.3 mmol/L Final   06/06/2023 01:37 PM 4.0 3.5 - 5.3 mmol/L Final     Chloride   Date/Time Value Ref Range Status   10/23/2023 01:17  98 - 107 mmol/L Final   09/19/2023 02:27  98 - 107 mmol/L Final   07/24/2023 09:34 AM 99 98 - 107 mmol/L Final   06/06/2023 01:37  98 - 107 mmol/L Final     Blood Urea Nitrogen   Date/Time Value Ref Range Status   07/24/2023 09:34 AM CANCELED       Comment:     Result canceled by the ancillary.   07/24/2023 09:34 AM 15 6 - 23 mg/dL Final     Urea Nitrogen   Date/Time Value Ref Range Status   10/23/2023 " 01:17 PM 14 6 - 23 mg/dL Final   10/23/2023 01:17 PM 14 6 - 23 mg/dL Final   09/19/2023 02:27 PM 17 6 - 23 mg/dL Final   07/24/2023 09:34 AM 15 6 - 23 mg/dL Final   06/06/2023 01:37 PM 16 6 - 23 mg/dL Final     Creatinine   Date/Time Value Ref Range Status   10/23/2023 01:17 PM 2.26 (H) 0.50 - 1.05 mg/dL Final   09/19/2023 02:27 PM 2.10 (H) 0.50 - 1.05 mg/dL Final   07/24/2023 09:34 AM 2.58 (H) 0.50 - 1.05 mg/dL Final   06/06/2023 01:37 PM 2.15 (H) 0.50 - 1.05 mg/dL Final     GFR Female   Date/Time Value Ref Range Status   09/19/2023 02:27 PM 29 (A) >90 mL/min/1.73m2 Final     Comment:      CALCULATIONS OF ESTIMATED GFR ARE PERFORMED   USING THE 2021 CKD-EPI STUDY REFIT EQUATION   WITHOUT THE RACE VARIABLE FOR THE IDMS-TRACEABLE   CREATININE METHODS.    https://jasn.asnjournals.org/content/early/2021/09/22/ASN.[email or web address]   07/24/2023 09:34 AM 23 (A) >90 mL/min/1.73m2 Final     Comment:      CALCULATIONS OF ESTIMATED GFR ARE PERFORMED   USING THE 2021 CKD-EPI STUDY REFIT EQUATION   WITHOUT THE RACE VARIABLE FOR THE IDMS-TRACEABLE   CREATININE METHODS.    https://jasn.asnjournals.org/content/early/2021/09/22/ASN.[email or web address]     06/06/2023 01:37 PM 29 (A) >90 mL/min/1.73m2 Final     Comment:      CALCULATIONS OF ESTIMATED GFR ARE PERFORMED   USING THE 2021 CKD-EPI STUDY REFIT EQUATION   WITHOUT THE RACE VARIABLE FOR THE IDMS-TRACEABLE   CREATININE METHODS.    https://jasn.asnjournals.org/content/early/2021/09/22/ASN.[email or web address]       eGFR   Date/Time Value Ref Range Status   10/23/2023 01:17 PM 27 (L) >60 mL/min/1.73m*2 Final     Comment:     Calculations of estimated GFR are performed using the 2021 CKD-EPI Study Refit equation without the race variable for the IDMS-Traceable creatinine methods.  https://jasn.asnjournals.org/content/early/2021/09/22/ASN.[email or web address]     ESTIMATED GFR   Date/Time Value Ref Range Status   04/12/2023 12:55 PM 25 mL/min/1.73 m2 Final     Comment:     CALCULATIONS OF ESTIMATED GFR ARE  PERFORMED USING THE 2021 CKD-EPI   STUDY REFIT EQUATION WITHOUT THE RACE VARIABLE FOR THE IDMS-TRACEABLE   CREATININE METHODS.  https://jasn.asnjournals.org/content/early/2021/09/22/ASN.[email or web address]  Performed at [address] [facility] [address]       Calcium   Date/Time Value Ref Range Status   10/23/2023 01:17 PM 9.0 8.6 - 10.3 mg/dL Final   09/19/2023 02:27 PM 9.0 8.6 - 10.6 mg/dL Final   07/24/2023 09:34 AM 9.3 8.6 - 10.3 mg/dL Final   06/06/2023 01:37 PM 9.3 8.6 - 10.6 mg/dL Final     Phosphorus   Date/Time Value Ref Range Status   10/23/2023 01:17 PM 3.0 2.5 - 4.9 mg/dL Final     Comment:     The performance characteristics of phosphorus testing in heparinized plasma have been validated by the individual  laboratory site where testing is performed. Testing on heparinized plasma is not approved by the FDA; however, such approval is not necessary.   04/15/2023 08:20 AM 3.8 2.5 - 4.9 mg/dL Final     Comment:      The performance characteristics of phosphorus testing in   heparinized plasma have been validated by the individual     laboratory site where testing is performed. Testing    on heparinized plasma is not approved by the FDA;    however, such approval is not necessary.     02/03/2022 08:13 AM 3.4 2.5 - 4.9 mg/dL Final     Comment:      The performance characteristics of phosphorus testing in   heparinized plasma have been validated by the individual     laboratory site where testing is performed. Testing    on heparinized plasma is not approved by the FDA;    however, such approval is not necessary.     02/02/2022 07:49 AM 2.2 (L) 2.5 - 4.9 mg/dL Final     Comment:      The performance characteristics of phosphorus testing in   heparinized plasma have been validated by the individual     laboratory site where testing is performed. Testing    on heparinized plasma is not approved by the FDA;    however, such approval is not necessary.       Parathyroid Hormone, Intact   Date/Time Value  Ref Range Status   10/23/2023 01:17 .2 (H) 18.5 - 88.0 pg/mL Final     PTH   Date/Time Value Ref Range Status   11/22/2021 11:30 AM 56.8 18.5 - 88.0 pg/mL Final   04/26/2021 02:52 PM 12.3 (L) 18.5 - 88.0 pg/mL Final   10/28/2020 04:05 .5 (H) 12.0 - 88.0 pg/mL Final     Comment:      Note new reference range as of 09/24/2020.     Vitamin D, 25-Hydroxy, Total   Date/Time Value Ref Range Status   10/23/2023 01:17 PM 31 30 - 100 ng/mL Final     Vitamin D, 25-Hydroxy   Date/Time Value Ref Range Status   02/15/2022 01:35 PM 46 ng/mL Final     Comment:     .  DEFICIENCY:         < 20   NG/ML  INSUFFICIENCY:      20-29  NG/ML  SUFFICIENCY:         NG/ML    THIS ASSAY ACCURATELY QUANTIFIES THE SUM OF  VITAMIN D3, 25-HYDROXY AND VIT D2,25-HYDROXY.     01/13/2022 02:31 AM 48 ng/mL Final     Comment:     .  DEFICIENCY:         < 20   NG/ML  INSUFFICIENCY:      20-29  NG/ML  SUFFICIENCY:         NG/ML    THIS ASSAY ACCURATELY QUANTIFIES THE SUM OF  VITAMIN D3, 25-HYDROXY AND VIT D2,25-HYDROXY.     11/22/2021 11:30 AM 37 ng/mL Final     Comment:     .  DEFICIENCY:         < 20   NG/ML  INSUFFICIENCY:      20-29  NG/ML  SUFFICIENCY:         NG/ML    THIS ASSAY ACCURATELY QUANTIFIES THE SUM OF  VITAMIN D3, 25-HYDROXY AND VIT D2,25-HYDROXY.       Albumin/Creatine Ratio   Date/Time Value Ref Range Status   11/22/2021 11:30 AM 91.1 (H) 0.0 - 30.0 ug/mg crt Final   04/26/2021 02:52 PM 83.1 (H) 0.0 - 30.0 ug/mg crt Final   06/25/2019 02:32 PM 2,602.4 (H) 0.0 - 30.0 ug/mg crt Final     Hemoglobin   Date/Time Value Ref Range Status   06/06/2023 01:37 PM 11.6 (L) 12.0 - 16.0 g/dL Final   04/15/2023 08:20 AM 8.7 (L) 12.0 - 16.0 g/dL Final   04/14/2023 03:48 PM 10.4 (L) 12.0 - 16.0 g/dL Final         43 year old with h/o dialysis dependent ESRD since Jan 2022, now has lower creatinine levels.  24 hour urine creatinine and urea clearance averaged was 19 ml/min, will continue to hold RRT. Reviewed uremic symptoms  again, advised to call me ASAP if she has any uremic symptoms. Avoid NSAIDs and herbal preps.   Given puffiness of face and mild leg swelling, recommend starting torsemide 40 mg PO daily, monitor weight at home  Start calcitriol 0.25 mcg daily  Will check CBC and iron studies prior to next visit and arrange for outpatient Aranesp if needed    RTC: 2 mo       ADDENDUM 11/21/23:  Mr Mosqueda called, 's BP is in 160s-170s/90s-100s range, she has mild intermittent headaches, otherwise feels well. Thinks her face and leg swelling has improved but is not sure. Advised to weigh herself every AM in nightclothes, take an extra dose of Torsemide 20 mg in afternoon if she gains more than 3 lbs in a day or 5 lbs in a week. Also advised to start Amlodipine 10 mg Po daily, rx sent. Advised to continue sodium restriction and to call me in a week with an update on BP readings.

## 2023-11-10 ENCOUNTER — TELEPHONE (OUTPATIENT)
Dept: ENDOCRINOLOGY | Facility: CLINIC | Age: 43
End: 2023-11-10
Payer: COMMERCIAL

## 2023-11-10 DIAGNOSIS — E10.65 UNCONTROLLED TYPE 1 DIABETES MELLITUS WITH HYPERGLYCEMIA (MULTI): Primary | ICD-10-CM

## 2023-11-10 NOTE — TELEPHONE ENCOUNTER
Needs Rx for Basaglar snt to [facility] on [address]. Up to 15 units daily.  Last office visit:  06/28/23  Next office visit:  12/14/23

## 2023-11-13 RX ORDER — INSULIN GLARGINE 100 [IU]/ML
INJECTION, SOLUTION SUBCUTANEOUS
Qty: 15 ML | Refills: 1 | Status: SHIPPED | OUTPATIENT
Start: 2023-11-13 | End: 2024-04-12 | Stop reason: SDUPTHER

## 2023-11-21 DIAGNOSIS — I10 BENIGN ESSENTIAL HYPERTENSION: Primary | ICD-10-CM

## 2023-11-21 RX ORDER — AMLODIPINE BESYLATE 10 MG/1
10 TABLET ORAL DAILY
Qty: 90 TABLET | Refills: 1 | Status: SHIPPED | OUTPATIENT
Start: 2023-11-21 | End: 2024-05-19

## 2024-01-08 ENCOUNTER — APPOINTMENT (OUTPATIENT)
Dept: PRIMARY CARE | Facility: CLINIC | Age: 44
End: 2024-01-08
Payer: COMMERCIAL

## 2024-01-11 ENCOUNTER — APPOINTMENT (OUTPATIENT)
Dept: PRIMARY CARE | Facility: CLINIC | Age: 44
End: 2024-01-11
Payer: COMMERCIAL

## 2024-01-18 ENCOUNTER — APPOINTMENT (OUTPATIENT)
Dept: PRIMARY CARE | Facility: CLINIC | Age: 44
End: 2024-01-18
Payer: COMMERCIAL

## 2024-01-25 ENCOUNTER — APPOINTMENT (OUTPATIENT)
Dept: ENDOCRINOLOGY | Facility: CLINIC | Age: 44
End: 2024-01-25
Payer: COMMERCIAL

## 2024-01-25 NOTE — PROGRESS NOTES
Subjective   Lien Moy is a 43 y.o. female presents today for a follow up of DM Type ***. . Initial diagnosis with diabetes was age 19.   Known complications include: HPL, CKD, HTN    Last visit with me 6/2023  She has no showed and cancelled appt between  A1c 5.8% in June 2023. Previously 6% in 4/2023 - Discussed this is likely falsely low due to her hypoglycemia and renal disease  Since last visit, she continues dialysis on Monday Wednesday Friday 545am-915am  Dialysis graft failed so she had to have a port placed. Had surgery for new access to right upper arm. Can be accessed after July 4th.   After dialysis, she feels sugar is perfects  She feels sugars are high on dialysis.     Prior to hospitalization:  Basaglar 7 units twice daily  Lispro 5 units with meals plus sliding scale   If blood sugar is < 150 add 0 units   If blood sugar is 150-200 add 1 units   If blood sugar is 201-250 add 2 units  If blood sugar is 251-300 add 3 units   If blood sugar is 301-350 add 4 units   If blood sugar is 351-400 add 5 units   If blood sugar is 401-450 add 6 units  If blood sugar is over 450, call the office      Current regimen post hospital discharge:   Basaglar 16 units daily in afternoon (self adjusted, prescribed 13 units)  Lispro 3-6 units after meals plus sliding scale if needed with meals   If blood sugar is < 150 add 0 units   If blood sugar is 150-200 add 1 units   If blood sugar is 201-250 add 2 units  If blood sugar is 251-300 add 3 units   If blood sugar is 301-350 add 4 units   If blood sugar is 351-400 add 5 units   If blood sugar is 401-450 add 6 units  If blood sugar is over 450, call the office        Patient is using continuous glucose monitor - Dexcom G6  The patient is/is not*** currently checking the blood glucose *** times per day         Hypoglycemia frequency: ***  Hypoglycemia awareness: ***     Regarding symptoms of hyperglycemia, the patient is ***not experiencing any symptoms such as  "***polyuria, polydipsia, nocturia or rapid weight loss or blurry vision. The patient comes into the office today ***.        ROS  General: no fever, chills or acute changes in weight in the last 6 months  Skin: no rashes, pruritis or dry skin  Cardiac: denies chest pain, heart palpitations or orthopnea  Pulmonary: denies wheezing, productive cough or exertional dyspnea      Objective    Physical Exam  Unable to obtain blood pressure today due to virtual visit  General Appearance: Well appearing, alert, in no acute distress, well-hydrated, well nourished.  Affect: alert, cooperative         Current Outpatient Medications:     acetone, urine, test (TRUEplus Ketone) strip, test urine for ketones if blood sugar >250, with illness, or if pump malfunctions, Disp: , Rfl:     amLODIPine (Norvasc) 10 mg tablet, Take 1 tablet (10 mg) by mouth once daily., Disp: 90 tablet, Rfl: 1    aspirin 81 mg EC tablet, Take 1 tablet (81 mg) by mouth once daily., Disp: , Rfl:     Basaglar KwikPen U-100 Insulin 100 unit/mL (3 mL) pen, ADMINISTER UP TO 15 UNITS UNDER THE SKIN DAILY, Disp: 15 mL, Rfl: 1    BD Ultra-Fine Short Pen Needle 31 gauge x 5/16\" needle, USE WITH INSULIN PENS FIVE TIMES DAILY, Disp: , Rfl:     buprenorphine-naloxone (Suboxone) 8-2 mg SL tablet, DISSOLVE ONE TABLET UNDER THE TONGUE TWICE DAILY, Disp: , Rfl:     calcitriol (Rocaltrol) 0.5 mcg capsule, Take 1 capsule (0.5 mcg) by mouth once daily., Disp: 30 capsule, Rfl: 5    cetirizine (ZyrTEC) 10 mg tablet, Take by mouth., Disp: , Rfl:     DULoxetine (Cymbalta) 20 mg DR capsule, Take by mouth., Disp: , Rfl:     ergocalciferol (Vitamin D-2) 1.25 MG (99320 UT) capsule, Take 1 capsule (1,250 mcg) by mouth 1 (one) time per week., Disp: , Rfl:     ethyl chloride 100 % spray, USE TOPICALLY AS DIRECTED., Disp: , Rfl:     famotidine (Pepcid) 10 mg tablet, Take by mouth., Disp: , Rfl:     fluticasone (Flonase) 50 mcg/actuation nasal spray, SHAKE LIQUID AND USE 1 SPRAY IN EACH " NOSTRIL EVERY DAY FOR 10 DAYS AS DIRECTED, Disp: , Rfl:     gabapentin (Neurontin) 300 mg capsule, TAKE 1 CAPSULE BY MOUTH AFTER DIALYSIS ON EVERY DIALYSIS DAY, Disp: , Rfl:     glucagon 1 mg injection, 1 mg., Disp: , Rfl:     glucose 4 gram chewable tablet, Chew once daily as needed., Disp: , Rfl:     Hibiclens 4 % external liquid, USE AS DIRECTED FOR PREOPERATIVE SHOWER, Disp: , Rfl:     hydrOXYzine HCL (Atarax) 25 mg tablet, Take by mouth., Disp: , Rfl:     insulin aspart (NovoLOG) 100 unit/mL injection, Inject under the skin., Disp: , Rfl:     insulin lispro (HumaLOG) 100 unit/mL injection, Inject under the skin., Disp: , Rfl:     isosorbide mononitrate ER (Imdur) 30 mg 24 hr tablet, Take 1 tablet (30 mg) by mouth once daily., Disp: , Rfl:     ketoconazole (NIZOral) 2 % cream, Apply topically once daily., Disp: , Rfl:     lidocaine-prilocaine (Emla) 2.5-2.5 % cream, APPLY TOPICALLY TO THE AFFECTED AREA 1 HOUR BEFORE PROCEDURE AS DIRECTED, Disp: , Rfl:     NovoLOG FlexPen U-100 Insulin 100 unit/mL (3 mL) pen, INJECT 3 UNITS WITH MEALS PLUS SLIDING SCALE. GIVE ONE UNIT FOR EVERY 50 OVER 150. MAX DAILY DOSE OF 55 UNITS, Disp: , Rfl:     ofloxacin (Ocuflox) 0.3 % ophthalmic solution, INSTILL 1 DROP IN LEFT EYE FOUR TIMES DAILY, Disp: , Rfl:     oxyCODONE (Roxicodone) 5 mg immediate release tablet, TAKE 1 TABLET BY MOUTH EVERY 4 HOURS AS NEEDED FOR POST OP PAIN, Disp: , Rfl:     prednisoLONE acetate (Pred-Forte) 1 % ophthalmic suspension, SHAKE LIQUID AND INSTILL 1 DROP IN LEFT EYE FOUR TIMES DAILY, Disp: , Rfl:     Restasis 0.05 % ophthalmic emulsion, , Disp: , Rfl:     torsemide (Demadex) 20 mg tablet, Take 2 tablets (40 mg) by mouth once daily., Disp: 360 tablet, Rfl: 0    torsemide 40 mg tablet, Take 40 mg by mouth once daily., Disp: 60 tablet, Rfl: 2    Assessment/Plan

## 2024-02-01 ENCOUNTER — APPOINTMENT (OUTPATIENT)
Dept: PRIMARY CARE | Facility: CLINIC | Age: 44
End: 2024-02-01
Payer: COMMERCIAL

## 2024-02-12 ENCOUNTER — APPOINTMENT (OUTPATIENT)
Dept: ENDOCRINOLOGY | Facility: CLINIC | Age: 44
End: 2024-02-12
Payer: COMMERCIAL

## 2024-02-12 ENCOUNTER — APPOINTMENT (OUTPATIENT)
Dept: NEPHROLOGY | Facility: CLINIC | Age: 44
End: 2024-02-12
Payer: COMMERCIAL

## 2024-02-19 ENCOUNTER — APPOINTMENT (OUTPATIENT)
Dept: NEPHROLOGY | Facility: CLINIC | Age: 44
End: 2024-02-19
Payer: COMMERCIAL

## 2024-02-20 ENCOUNTER — LAB (OUTPATIENT)
Dept: LAB | Facility: LAB | Age: 44
End: 2024-02-20
Payer: COMMERCIAL

## 2024-02-20 DIAGNOSIS — Z99.2 ESRD (END STAGE RENAL DISEASE) ON DIALYSIS (MULTI): ICD-10-CM

## 2024-02-20 DIAGNOSIS — N18.6 ESRD (END STAGE RENAL DISEASE) ON DIALYSIS (MULTI): ICD-10-CM

## 2024-02-20 LAB
25(OH)D3 SERPL-MCNC: 28 NG/ML (ref 30–100)
ALBUMIN SERPL BCP-MCNC: 4.1 G/DL (ref 3.4–5)
ANION GAP SERPL CALC-SCNC: 16 MMOL/L (ref 10–20)
BUN SERPL-MCNC: 15 MG/DL (ref 6–23)
CALCIUM SERPL-MCNC: 9.4 MG/DL (ref 8.6–10.3)
CHLORIDE SERPL-SCNC: 101 MMOL/L (ref 98–107)
CO2 SERPL-SCNC: 26 MMOL/L (ref 21–32)
CREAT SERPL-MCNC: 2.24 MG/DL (ref 0.5–1.05)
EGFRCR SERPLBLD CKD-EPI 2021: 27 ML/MIN/1.73M*2
ERYTHROCYTE [DISTWIDTH] IN BLOOD BY AUTOMATED COUNT: 12.5 % (ref 11.5–14.5)
FERRITIN SERPL-MCNC: 1280 NG/ML (ref 8–150)
GLUCOSE SERPL-MCNC: 170 MG/DL (ref 74–99)
HCT VFR BLD AUTO: 37.5 % (ref 36–46)
HGB BLD-MCNC: 12.2 G/DL (ref 12–16)
IRON SATN MFR SERPL: 40 % (ref 25–45)
IRON SERPL-MCNC: 85 UG/DL (ref 35–150)
MCH RBC QN AUTO: 31.6 PG (ref 26–34)
MCHC RBC AUTO-ENTMCNC: 32.5 G/DL (ref 32–36)
MCV RBC AUTO: 97 FL (ref 80–100)
NRBC BLD-RTO: 0 /100 WBCS (ref 0–0)
PHOSPHATE SERPL-MCNC: 2.9 MG/DL (ref 2.5–4.9)
PLATELET # BLD AUTO: 218 X10*3/UL (ref 150–450)
POTASSIUM SERPL-SCNC: 3.6 MMOL/L (ref 3.5–5.3)
PTH-INTACT SERPL-MCNC: 91.7 PG/ML (ref 18.5–88)
RBC # BLD AUTO: 3.86 X10*6/UL (ref 4–5.2)
SODIUM SERPL-SCNC: 139 MMOL/L (ref 136–145)
TIBC SERPL-MCNC: 215 UG/DL (ref 240–445)
UIBC SERPL-MCNC: 130 UG/DL (ref 110–370)
WBC # BLD AUTO: 12.3 X10*3/UL (ref 4.4–11.3)

## 2024-02-20 PROCEDURE — 36415 COLL VENOUS BLD VENIPUNCTURE: CPT

## 2024-02-20 PROCEDURE — 80069 RENAL FUNCTION PANEL: CPT

## 2024-02-20 PROCEDURE — 82728 ASSAY OF FERRITIN: CPT

## 2024-02-20 PROCEDURE — 85027 COMPLETE CBC AUTOMATED: CPT

## 2024-02-20 PROCEDURE — 83970 ASSAY OF PARATHORMONE: CPT

## 2024-02-20 PROCEDURE — 82610 CYSTATIN C: CPT

## 2024-02-20 PROCEDURE — 83540 ASSAY OF IRON: CPT

## 2024-02-20 PROCEDURE — 82306 VITAMIN D 25 HYDROXY: CPT

## 2024-02-20 PROCEDURE — 83550 IRON BINDING TEST: CPT

## 2024-02-22 LAB
CYSTATIN C SERPL-MCNC: 2.9 MG/L (ref 0.5–1.2)
GFR/BSA.PRED SERPLBLD CYS-BASED-ARV: 19 ML/MIN/BSA

## 2024-02-26 ENCOUNTER — APPOINTMENT (OUTPATIENT)
Dept: NEPHROLOGY | Facility: CLINIC | Age: 44
End: 2024-02-26
Payer: COMMERCIAL

## 2024-02-29 ENCOUNTER — APPOINTMENT (OUTPATIENT)
Dept: NEPHROLOGY | Facility: CLINIC | Age: 44
End: 2024-02-29
Payer: COMMERCIAL

## 2024-04-09 ENCOUNTER — LAB (OUTPATIENT)
Dept: LAB | Facility: LAB | Age: 44
End: 2024-04-09
Payer: COMMERCIAL

## 2024-04-09 DIAGNOSIS — M10.9 GOUT, UNSPECIFIED: Primary | ICD-10-CM

## 2024-04-09 LAB — URATE SERPL-MCNC: 4.6 MG/DL (ref 2.3–6.7)

## 2024-04-09 PROCEDURE — 84550 ASSAY OF BLOOD/URIC ACID: CPT

## 2024-04-09 PROCEDURE — 36415 COLL VENOUS BLD VENIPUNCTURE: CPT

## 2024-04-12 DIAGNOSIS — E10.65 UNCONTROLLED TYPE 1 DIABETES MELLITUS WITH HYPERGLYCEMIA (MULTI): ICD-10-CM

## 2024-04-12 RX ORDER — INSULIN GLARGINE 100 [IU]/ML
INJECTION, SOLUTION SUBCUTANEOUS
Qty: 15 ML | Refills: 0 | Status: SHIPPED | OUTPATIENT
Start: 2024-04-12 | End: 2024-05-30 | Stop reason: SDUPTHER

## 2024-04-12 NOTE — TELEPHONE ENCOUNTER
Last office visit:  06/28/23  Next office visit:  N/a - has cancelled or no showed to all appts w/in the last year    Requested Prescriptions     Pending Prescriptions Disp Refills    Basaglar KwikPen U-100 Insulin 100 unit/mL (3 mL) pen 15 mL 0     Sig: ADMINISTER UP TO 15 UNITS UNDER THE SKIN DAILY.  * No further refills will be provided until patient has been seen in office.

## 2024-04-19 NOTE — OP NOTE
PREOPERATIVE DIAGNOSIS:  End-stage renal disease.    POSTOPERATIVE DIAGNOSIS:  End-stage renal disease.    OPERATION/PROCEDURE:  Right axillo-axillary looped arteriovenous graft.    SURGEON:  Jennifer Mancia MD.    ASSISTANT(S):  Orlando oByd MD.    ANESTHESIA:  General endotracheal.    ESTIMATED BLOOD LOSS:  50 cc.    SPECIMENS:  None.    CLINICAL NOTE:  This 42-year-old woman with end-stage renal disease is currently  dialyzing via a tunneled dialysis catheter.  She previously had a  left axillo-axillary AV graft.  That failed on multiple occasions,  and has been abandoned.  She presents at this time for an attempt at  construction of AV graft in her opposite right arm.     DESCRIPTION OF PROCEDURE:  The patient was placed on the operating table in supine position with  right arm out.  Following induction of general endotracheal  anesthesia, the right arm was interrogated with ultrasound.  This  demonstrated a very small arterial structures throughout, consistent  with the formal preoperative duplex mapping studies obtained.  At the  level of the mid arm, the right brachial artery measured  approximately 3.0 mm diameter.  The artery was a bit larger near the  axilla, measuring about 4 mm in diameter.  There were no surface  veins of any significant size for construction of a primary AV  fistula.  The basilic vein entered the axillary vein at the apex of  the axilla, the axillary vein itself was about 7 mm in diameter.  The  arm was prepped and draped.     A longitudinal skin incision was made along the anterior aspect of  the mid to high axillary fossa.  Cautery was used to develop the  incision through the subcutaneous tissue.  The axillary vein was  encountered first, and it was gently skeletonized, vessel loop being  the brachial vein and the basilic vein tributaries separately.  More  centrally, the axillary vein was vessel looped for control.  The  dissection was then continued anterior  to this location, overlying  the pulsation of the axillary artery.  The axillary artery was  skeletonized and vessel looped for control, at the point where it  measured approximately 4 mm in diameter.  Next, a looping  subcutaneous tunnel was created in the anterolateral right arm, using  a tunneling device and an apical counterincision for assistance.  A  piece of 4 to 7 mm tapered diameter Hilton Propaten PTFE graft was  passed through the tunnel, orienting the arterial limb  superolaterally.     Vessel loops were pulled up to occluding tension on the axillary  artery, which was opened longitudinally.  Heparin saline was flushed  towards the hand.  The 4 mm end of the PTFE was trimmed and very  gently spatulated.  End-to-side anastomosis was constructed to the  axillary artery with running 6-0 Prolene suture.  The suture line was  completed, and all vessel loops were released, allowing flow to the  hand.  The suture line was packed with thrombin Gelfoam.     Vessel loops were pulled up to occluding tension on the axillary,  basilic, and brachial veins, and the axillary vein was opened  longitudinally.  A 7 mm end of the PTFE was trimmed and spatulated.  A patulous end-to-side anastomosis was constructed to the axillary  vein using running 6-0 Prolene suture.  The suture line was  completed, and all clamps and vessel loops were released, allowing  flow through the graft.  A thrill appeared.     Hemostasis was achieved at both of the suture lines with  thrombin-soaked Gelfoam.  The Gelfoam was then irrigated away with  antibiotic containing saline.  The wound was then infiltrated with  local anesthetic for comfort.  The deep fascia was unable to be  closed completely over the anastomosis.  However, the superficial  fascia was closed securely with interrupted 3-0 Vicryl.  The  subcutaneous tissue was closed by a second 3-0 Vicryl layer.  The  skin was closed with 4-0 Maxon in subcuticular fashion.  Steri-Strips  and  dry dressing were applied.  The apical counterincision was closed  in layers as well, using interrupted 3-0 Vicryl on the subcutaneous  tissue and 4-0 Maxon in subcuticular fashion on the skin.  Skin glue  was applied to the counterincision to seal it.     The patient was awakened and extubated in the operating room.  She  was taken to Recovery awake and in stable condition, having tolerated  the procedure well.  At the conclusion of the procedure, there was a  thrill palpable over the AV graft in the right arm.  The radial pulse  remains palpable at the right wrist.       Jennifer Mancia MD    DD:  06/13/2023 17:19:25 EST  DT:  06/14/2023 05:20:21 EST  DICTATION NUMBER:  [id number]  INTERNAL JOB NUMBER:  [id number]    CC:  Jennifer Mancia MD, Fax: [phone]       Electronic Signatures:  Unsigned, Draft (SYS GENERATED) (Entered on 14-Jun-2023 05:20)   Entered   Jennifer Mancia) (Signed on 20-Jun-2023 12:53)   Authored     Last Updated: 20-Jun-2023 12:53 by Jennifer Mancia)

## 2024-04-22 ENCOUNTER — TELEPHONE (OUTPATIENT)
Dept: ENDOCRINOLOGY | Facility: CLINIC | Age: 44
End: 2024-04-22
Payer: COMMERCIAL

## 2024-04-22 NOTE — TELEPHONE ENCOUNTER
Received faxed request from [facility] on 4/22 for most recent office note.    Returned stating patient has not been seen since June 2023. She has an appt on 5/30

## 2024-04-25 ENCOUNTER — APPOINTMENT (OUTPATIENT)
Dept: NEPHROLOGY | Facility: CLINIC | Age: 44
End: 2024-04-25
Payer: COMMERCIAL

## 2024-05-30 ENCOUNTER — TELEMEDICINE (OUTPATIENT)
Dept: ENDOCRINOLOGY | Facility: CLINIC | Age: 44
End: 2024-05-30
Payer: COMMERCIAL

## 2024-05-30 DIAGNOSIS — E10.649 HYPOGLYCEMIA UNAWARENESS ASSOCIATED WITH TYPE 1 DIABETES MELLITUS (MULTI): ICD-10-CM

## 2024-05-30 DIAGNOSIS — N18.6 ESRD (END STAGE RENAL DISEASE) (MULTI): ICD-10-CM

## 2024-05-30 DIAGNOSIS — E10.649 HYPOGLYCEMIA DUE TO TYPE 1 DIABETES MELLITUS (MULTI): ICD-10-CM

## 2024-05-30 DIAGNOSIS — E10.65 UNCONTROLLED TYPE 1 DIABETES MELLITUS WITH HYPERGLYCEMIA (MULTI): ICD-10-CM

## 2024-05-30 DIAGNOSIS — E10.65 UNCONTROLLED TYPE 1 DIABETES MELLITUS WITH HYPERGLYCEMIA, WITH LONG-TERM CURRENT USE OF INSULIN (MULTI): Primary | ICD-10-CM

## 2024-05-30 PROCEDURE — 95251 CONT GLUC MNTR ANALYSIS I&R: CPT | Performed by: NURSE PRACTITIONER

## 2024-05-30 PROCEDURE — 99214 OFFICE O/P EST MOD 30 MIN: CPT | Performed by: NURSE PRACTITIONER

## 2024-05-30 RX ORDER — INSULIN GLARGINE 100 [IU]/ML
INJECTION, SOLUTION SUBCUTANEOUS
Qty: 15 ML | Refills: 1 | Status: SHIPPED | OUTPATIENT
Start: 2024-05-30

## 2024-05-30 RX ORDER — INSULIN ASPART 100 [IU]/ML
INJECTION, SOLUTION INTRAVENOUS; SUBCUTANEOUS
COMMUNITY
Start: 2024-05-30

## 2024-05-30 NOTE — Clinical Note
Please assist with scheduling 4 to 6-month DM follow-up in person visit.  Next visit must be in person.  Please also schedule in person visit with diabetes education for prepump training.  Thank you KINZA Montoya-CNP

## 2024-05-30 NOTE — PROGRESS NOTES
Subjective   Lien Moy is a 43 y.o. female presents today for a follow up of DM Type 1.  Initial diagnosis with diabetes was age 19.   Known complications include: HPL, CKD, HTN    Last visit with me 6/2023  She has no showed or cancelled many appts in between  A1c 6% in 4/2023.  Previous A1c 5.8%.  Discussed this is likely falsely low due to her hypoglycemia and renal disease  Since last visit, she was on dialysis on Monday Wednesday Friday 545am-915am  Has been off dialysis now for 1 year   Thought COVID set her over the edge and needing dialysis    Has a donor, sister   But unsure where we stand with that  Her appetite was very low and that is improved    Often taking her lispro after eating because she is unsure if she will eat       Prior to hospitalization:  Basaglar 7 units twice daily  Lispro 5 units with meals plus sliding scale   If Blood Sugar is < 150 add 0 units  If Blood Sugar is 151-200 add 1 units  If Blood Sugar is 201-250 add 2 units  If Blood Sugar is 251-300 add 3 units  If Blood Sugar is 301-350 add 4 units  If Blood Sugar is 351-400 add 5 units  If Blood Sugar is 401-450 add 6 units  >450, call the office    Current regimen post hospital discharge:   Basaglar 16 units daily in afternoon  Lispro 3-6 units after meals plus sliding scale if needed with meals  If Blood Sugar is < 150 add 0 units  If Blood Sugar is 151-200 add 0 units  If Blood Sugar is 201-250 add 1 units  If Blood Sugar is 251-300 add 2 units  If Blood Sugar is 301-350 add 3 units  If Blood Sugar is 351-400 add 4 units  >400, call the office   **does not seem to use structured sliding scale and will add insulin on her own.     Patient is using continuous glucose monitor - Dexcom G6  The patient is currently checking the blood glucose as needed         Hypoglycemia frequency: 5% lows, 2% very low   Hypoglycemia awareness: Not always     The patient comes into the office today with hyperglycemia and hypoglycemia.   "      ROS  General: no fever, chills or acute changes in weight in the last 6 months  Skin: no rashes, pruritis or dry skin  Cardiac: denies chest pain, heart palpitations or orthopnea  Pulmonary: denies wheezing, productive cough or exertional dyspnea      Objective    Physical Exam  Unable to obtain blood pressure today due to virtual visit  General Appearance: Well appearing, alert, in no acute distress, well-hydrated, well nourished.  Affect: alert, cooperative         Current Outpatient Medications:     acetone, urine, test (TRUEplus Ketone) strip, test urine for ketones if blood sugar >250, with illness, or if pump malfunctions, Disp: , Rfl:     amLODIPine (Norvasc) 10 mg tablet, Take 1 tablet (10 mg) by mouth once daily., Disp: 90 tablet, Rfl: 1    aspirin 81 mg EC tablet, Take 1 tablet (81 mg) by mouth once daily., Disp: , Rfl:     Basaglar KwikPen U-100 Insulin 100 unit/mL (3 mL) pen, ADMINISTER UP TO 15 UNITS UNDER THE SKIN DAILY.  * No further refills will be provided until patient has been seen in office., Disp: 15 mL, Rfl: 0    BD Ultra-Fine Short Pen Needle 31 gauge x 5/16\" needle, USE WITH INSULIN PENS FIVE TIMES DAILY, Disp: , Rfl:     buprenorphine-naloxone (Suboxone) 8-2 mg SL tablet, DISSOLVE ONE TABLET UNDER THE TONGUE TWICE DAILY, Disp: , Rfl:     calcitriol (Rocaltrol) 0.5 mcg capsule, Take 1 capsule (0.5 mcg) by mouth once daily., Disp: 30 capsule, Rfl: 5    cetirizine (ZyrTEC) 10 mg tablet, Take by mouth., Disp: , Rfl:     DULoxetine (Cymbalta) 20 mg DR capsule, Take by mouth., Disp: , Rfl:     ergocalciferol (Vitamin D-2) 1.25 MG (02279 UT) capsule, Take 1 capsule (1,250 mcg) by mouth 1 (one) time per week., Disp: , Rfl:     ethyl chloride 100 % spray, USE TOPICALLY AS DIRECTED., Disp: , Rfl:     famotidine (Pepcid) 10 mg tablet, Take by mouth., Disp: , Rfl:     fluticasone (Flonase) 50 mcg/actuation nasal spray, SHAKE LIQUID AND USE 1 SPRAY IN EACH NOSTRIL EVERY DAY FOR 10 DAYS AS DIRECTED, " Disp: , Rfl:     gabapentin (Neurontin) 300 mg capsule, TAKE 1 CAPSULE BY MOUTH AFTER DIALYSIS ON EVERY DIALYSIS DAY, Disp: , Rfl:     glucagon 1 mg injection, 1 mg., Disp: , Rfl:     glucose 4 gram chewable tablet, Chew once daily as needed., Disp: , Rfl:     Hibiclens 4 % external liquid, USE AS DIRECTED FOR PREOPERATIVE SHOWER, Disp: , Rfl:     hydrOXYzine HCL (Atarax) 25 mg tablet, Take by mouth., Disp: , Rfl:     insulin aspart (NovoLOG) 100 unit/mL injection, Inject under the skin., Disp: , Rfl:     insulin lispro (HumaLOG) 100 unit/mL injection, Inject under the skin., Disp: , Rfl:     isosorbide mononitrate ER (Imdur) 30 mg 24 hr tablet, Take 1 tablet (30 mg) by mouth once daily., Disp: , Rfl:     ketoconazole (NIZOral) 2 % cream, Apply topically once daily., Disp: , Rfl:     lidocaine-prilocaine (Emla) 2.5-2.5 % cream, APPLY TOPICALLY TO THE AFFECTED AREA 1 HOUR BEFORE PROCEDURE AS DIRECTED, Disp: , Rfl:     NovoLOG FlexPen U-100 Insulin 100 unit/mL (3 mL) pen, INJECT 3 UNITS WITH MEALS PLUS SLIDING SCALE. GIVE ONE UNIT FOR EVERY 50 OVER 150. MAX DAILY DOSE OF 55 UNITS, Disp: , Rfl:     ofloxacin (Ocuflox) 0.3 % ophthalmic solution, INSTILL 1 DROP IN LEFT EYE FOUR TIMES DAILY, Disp: , Rfl:     oxyCODONE (Roxicodone) 5 mg immediate release tablet, TAKE 1 TABLET BY MOUTH EVERY 4 HOURS AS NEEDED FOR POST OP PAIN, Disp: , Rfl:     prednisoLONE acetate (Pred-Forte) 1 % ophthalmic suspension, SHAKE LIQUID AND INSTILL 1 DROP IN LEFT EYE FOUR TIMES DAILY, Disp: , Rfl:     Restasis 0.05 % ophthalmic emulsion, , Disp: , Rfl:     torsemide (Demadex) 20 mg tablet, Take 2 tablets (40 mg) by mouth once daily., Disp: 360 tablet, Rfl: 0    torsemide 40 mg tablet, Take 40 mg by mouth once daily., Disp: 60 tablet, Rfl: 2    Assessment/Plan   Uncontrolled type 1 diabetes with hyperglycemia with long-term use of insulin  Hypoglycemia due to type 1 diabetes  Hypoglycemia unawareness  End-stage renal disease:  -A1c at goal  however discussed this is likely falsely low due to renal function.  It is also likely low due to the amount of low blood sugars she has.  She continues to take Basaglar 16 units once daily though multiple times discussed reducing.  Recommended reducing again today to 14 units to start.  She will also have lows after meals but I do not have a good understanding of how much NovoLog she is taking with each meal.  She is also dosing after the meal based on a poor appetite and discussed restraining from giving extra insulin because her blood sugar is high when she does eat.  This is likely contributing to lows as well.  She has hypoglycemia unawareness and a Dexcom is a must.  Would normally consider upgrading to Dexcom G7 however she is interested in pump therapy.  Refer to diabetes education for prepump training.  She is not the most technologically savvy so we have to keep it simple for her.  Last time we spoke she was more interested in OmniPod.  She is no longer on dialysis and has been off for almost 1 year    Plan:  Lower Basaglar to 14 units once daily  Continue NovoLog 3 to 6 units with meals  Do not give extra  Use the sliding scale if needed for correction, 1: 50 above 200  Continue Dexcom G6 for now  Schedule with diabetes education for prepump training  Follow-up 4 to 6 months

## 2024-06-06 ENCOUNTER — APPOINTMENT (OUTPATIENT)
Dept: ENDOCRINOLOGY | Facility: CLINIC | Age: 44
End: 2024-06-06
Payer: COMMERCIAL

## 2024-06-10 ENCOUNTER — APPOINTMENT (OUTPATIENT)
Dept: NEPHROLOGY | Facility: CLINIC | Age: 44
End: 2024-06-10
Payer: COMMERCIAL

## 2024-06-17 NOTE — PROGRESS NOTES
"ANNUAL SUBJECTIVE    Lien Moy is a 43 y.o. female who presents for annual exam today. She would also like to discuss amenorrhea. Patient had regular monthly menses when she was younger but has now been without a period for the past 14 years. Was told she was in early menopause. Started bleeding three days ago for the first time in 14 years and has had light bleeding and cramping since. She is  sexually active with one male partner and does not use contraception. Last intercourse 3 weeks ago. She has poorly controlled T1DM, ESRD, and HTN but denies recent hospitalizations, life stressors, or changes in weight/diet/exercise. She denies HA, visual field disturbances, or galactorrhea. She has some acne on her chin that is new but denies hirsutism. She denies hot flashes or vaginal dryness. She does have cold intolerance which is unchanged from baseline. She denies constipation. Medication list reviewed, no new hormonal or hyperprolactinemia-inducing medications. OBGYN history reviewed, no recent OB hemorrhage, endometritis, or uterine surgeries.     OBHx:  x2, SAB x1, medical IAB  PMHx: uncontrolled T1DM with hypoglycemia unawareness, ESRD (off dialysis for the past year), HTN  SurgHx: anthony, elbow surgery, foot surgery  Meds/Allergies: reviewed and UTD  Social: former smoker (2 ppd for 20 years, quit 7 years ago), history of opioid use disorder on suboxone (last used 7 years ago), denies EtOH  FHx: denies breast or ovarian cancer    Last Pap (2021): NIL/HRHPV neg  Last Mammogram (): BIRADS category 1     OBJECTIVE  BP (!) 163/92   Ht 1.702 m (5' 7\")   Wt 70.9 kg (156 lb 3.2 oz)   LMP 06/15/2024   BMI 24.46 kg/m²     General Appearance   - consistent with stated age, well groomed and cooperative    Integumentary  - skin warm and dry without rash    Head and Neck  - normalocephalic and neck supple    Chest and Lung Exam  - normal breathing effort, no respiratory distress    Breast  - symmetry noted, " no mass palpable, no skin change and no nipple discharge.    Abdomen  - soft, nontender and no hepatomegaly, splenomegaly, or mass    Female Genitourinary  - vulva normal without rash or lesion, normal vaginal rugae, no vaginal discharge, uterus normal size & no palpable masses, no adnexal mass, no adnexal tenderness, no cervical motion tenderness    Peripheral Vascular  - no edema present    ASSESSMENT/PLAN  43 y.o.  female who presents for annual exam.       Actions performed during this visit include:  - Clinical breast exam  - Clinical pelvic exam  - Pap: UTD, next due in   - Mammogram: ordered today  - Contraception: none, amenorrheic, see below  - STI screening: declines  - PCP: Dr. Guardado    Secondary Amenorrhea   - No menses for 14 years then had bleeding that started 3 days ago and was light  - Differential is broad and includes hypothalamic, pituitary, ovarian, and uterine etiologies. Overall suspicion is for poorly controlled T1DM vs autoimmune ovarian insufficiency   - UPT negative in the office  - To send fasting FSH, E2, TSH, prolactin, free and total testosterone, and 17-OHP  - Will also send for TVUS to assess for PCOM as well as EMS. Consider EMB given prolonged amenorrhea and now with new bleeding    RTC in 1 month to review results     Carlene Resendez MD

## 2024-06-18 ENCOUNTER — LAB (OUTPATIENT)
Dept: LAB | Facility: LAB | Age: 44
End: 2024-06-18
Payer: COMMERCIAL

## 2024-06-18 ENCOUNTER — APPOINTMENT (OUTPATIENT)
Dept: OBSTETRICS AND GYNECOLOGY | Facility: CLINIC | Age: 44
End: 2024-06-18
Payer: COMMERCIAL

## 2024-06-18 VITALS
DIASTOLIC BLOOD PRESSURE: 92 MMHG | WEIGHT: 156.2 LBS | HEIGHT: 67 IN | SYSTOLIC BLOOD PRESSURE: 163 MMHG | BODY MASS INDEX: 24.52 KG/M2

## 2024-06-18 DIAGNOSIS — Z01.419 ENCOUNTER FOR BREAST AND PELVIC EXAMINATION: Primary | ICD-10-CM

## 2024-06-18 DIAGNOSIS — Z12.31 VISIT FOR SCREENING MAMMOGRAM: ICD-10-CM

## 2024-06-18 DIAGNOSIS — N91.1 SECONDARY AMENORRHEA: ICD-10-CM

## 2024-06-18 LAB
ESTRADIOL SERPL-MCNC: 30 PG/ML
FSH SERPL-ACNC: 12.2 IU/L
PREGNANCY TEST URINE, POC: NEGATIVE
PROLACTIN SERPL-MCNC: 7.2 UG/L (ref 3–20)
T4 FREE SERPL-MCNC: 1.08 NG/DL (ref 0.61–1.12)
TSH SERPL-ACNC: 4.11 MIU/L (ref 0.44–3.98)

## 2024-06-18 PROCEDURE — 84402 ASSAY OF FREE TESTOSTERONE: CPT

## 2024-06-18 PROCEDURE — 3077F SYST BP >= 140 MM HG: CPT | Performed by: STUDENT IN AN ORGANIZED HEALTH CARE EDUCATION/TRAINING PROGRAM

## 2024-06-18 PROCEDURE — 3080F DIAST BP >= 90 MM HG: CPT | Performed by: STUDENT IN AN ORGANIZED HEALTH CARE EDUCATION/TRAINING PROGRAM

## 2024-06-18 PROCEDURE — 83498 ASY HYDROXYPROGESTERONE 17-D: CPT

## 2024-06-18 PROCEDURE — 81025 URINE PREGNANCY TEST: CPT | Performed by: STUDENT IN AN ORGANIZED HEALTH CARE EDUCATION/TRAINING PROGRAM

## 2024-06-18 PROCEDURE — 99386 PREV VISIT NEW AGE 40-64: CPT | Performed by: STUDENT IN AN ORGANIZED HEALTH CARE EDUCATION/TRAINING PROGRAM

## 2024-06-18 PROCEDURE — 83001 ASSAY OF GONADOTROPIN (FSH): CPT

## 2024-06-18 PROCEDURE — 84443 ASSAY THYROID STIM HORMONE: CPT

## 2024-06-18 PROCEDURE — 82670 ASSAY OF TOTAL ESTRADIOL: CPT

## 2024-06-18 PROCEDURE — 84439 ASSAY OF FREE THYROXINE: CPT

## 2024-06-18 PROCEDURE — 84146 ASSAY OF PROLACTIN: CPT

## 2024-06-18 PROCEDURE — 36415 COLL VENOUS BLD VENIPUNCTURE: CPT

## 2024-06-18 ASSESSMENT — PATIENT HEALTH QUESTIONNAIRE - PHQ9
2. FEELING DOWN, DEPRESSED OR HOPELESS: NOT AT ALL
1. LITTLE INTEREST OR PLEASURE IN DOING THINGS: NOT AT ALL
SUM OF ALL RESPONSES TO PHQ9 QUESTIONS 1 & 2: 0

## 2024-06-20 ENCOUNTER — APPOINTMENT (OUTPATIENT)
Dept: ENDOCRINOLOGY | Facility: CLINIC | Age: 44
End: 2024-06-20
Payer: COMMERCIAL

## 2024-06-20 ENCOUNTER — TELEPHONE (OUTPATIENT)
Dept: ENDOCRINOLOGY | Facility: CLINIC | Age: 44
End: 2024-06-20

## 2024-06-20 ENCOUNTER — HOSPITAL ENCOUNTER (OUTPATIENT)
Dept: RADIOLOGY | Facility: HOSPITAL | Age: 44
Discharge: HOME | End: 2024-06-20
Payer: COMMERCIAL

## 2024-06-20 DIAGNOSIS — E10.65 UNCONTROLLED TYPE 1 DIABETES MELLITUS WITH HYPERGLYCEMIA, WITH LONG-TERM CURRENT USE OF INSULIN (MULTI): ICD-10-CM

## 2024-06-20 DIAGNOSIS — N91.1 SECONDARY AMENORRHEA: ICD-10-CM

## 2024-06-20 DIAGNOSIS — E10.65 UNCONTROLLED TYPE 1 DIABETES MELLITUS WITH HYPERGLYCEMIA, WITH LONG-TERM CURRENT USE OF INSULIN (MULTI): Primary | ICD-10-CM

## 2024-06-20 PROCEDURE — G0108 DIAB MANAGE TRN  PER INDIV: HCPCS | Performed by: STUDENT IN AN ORGANIZED HEALTH CARE EDUCATION/TRAINING PROGRAM

## 2024-06-20 PROCEDURE — 76856 US EXAM PELVIC COMPLETE: CPT

## 2024-06-20 RX ORDER — INSULIN ASPART 100 [IU]/ML
INJECTION, SOLUTION INTRAVENOUS; SUBCUTANEOUS
Qty: 20 ML | Refills: 11 | Status: SHIPPED | OUTPATIENT
Start: 2024-06-20

## 2024-06-20 RX ORDER — INSULIN PMP CART,AUT,G6/7,CNTR
1 EACH SUBCUTANEOUS
Qty: 10 EACH | Refills: 11 | Status: SHIPPED | OUTPATIENT
Start: 2024-06-20

## 2024-06-20 RX ORDER — GLUCAGON INJECTION, SOLUTION 1 MG/.2ML
1 INJECTION, SOLUTION SUBCUTANEOUS AS NEEDED
Qty: 0.4 ML | Refills: 3 | Status: SHIPPED | OUTPATIENT
Start: 2024-06-20

## 2024-06-20 RX ORDER — INSULIN PMP CART,AUT,G6/7,CNTR
1 EACH SUBCUTANEOUS DAILY
Qty: 1 EACH | Refills: 0 | Status: SHIPPED | OUTPATIENT
Start: 2024-06-20

## 2024-06-20 NOTE — TELEPHONE ENCOUNTER
Orders Placed This Encounter      Gvoke HypoPen 2-Pack 1 mg/0.2 mL auto-injector      insulin aspart (NovoLOG) 100 unit/mL injection      Omnipod 5 G6 Intro Kit, Gen 5, cartridge      Omnipod 5 G6 Pods, Gen 5, cartridge

## 2024-06-20 NOTE — PROGRESS NOTES
"Reason for Visit:  Lien Moy is a 43 y.o. female who presents for Initial DSME Visit    DSME - Global Assessment    Referring Provider: Rosio Jenkins   Marital Status: single.    Readiness to Learn: demonstrates willingness to learn and demonstrates ability to learn  Preferred learning method: observing, reading and writing, listening, and doing    Demographics:   Difficulties with: None  Highest Level of Education: High School  Race/Ethnic Origin: White/  Occupation:  disability      Type of Diabetes: Type 1  What year were you diagnosed with diabetes: 2000  Family History: no    Lab Results   Component Value Date    HGBA1C 6.0 (A) 04/15/2023    HGBA1C 8.1 (A) 10/25/2021    HGBA1C 6.9 07/27/2021    HGBA1C 7.4 03/22/2021    HGBA1C 8.7 12/08/2020    HGBA1C 8.7 09/10/2020       Current Outpatient Medications   Medication Sig Dispense Refill    amLODIPine (Norvasc) 10 mg tablet Take 1 tablet (10 mg) by mouth once daily. 90 tablet 1    Basaglar KwikPen U-100 Insulin 100 unit/mL (3 mL) pen ADMINISTER UP TO 14 UNITS UNDER THE SKIN DAILY. 15 mL 1    BD Ultra-Fine Short Pen Needle 31 gauge x 5/16\" needle USE WITH INSULIN PENS FIVE TIMES DAILY      buprenorphine-naloxone (Suboxone) 8-2 mg SL tablet DISSOLVE ONE TABLET UNDER THE TONGUE TWICE DAILY      calcitriol (Rocaltrol) 0.5 mcg capsule Take 1 capsule (0.5 mcg) by mouth once daily. 30 capsule 5    torsemide (Demadex) 20 mg tablet Take 2 tablets (40 mg) by mouth once daily. 360 tablet 0    torsemide 40 mg tablet Take 40 mg by mouth once daily. 60 tablet 2     No current facility-administered medications for this visit.   Dialysis, m, w, F (GFR improved and been off dialysis for 1 month)    Basaglar 14 units- morning  Novolog 3-3-3-0 +SSI #1 tx glucose >150mg/dL    -Can replicate settings into pump, but needs to work on identifying all carb sources and start by adjusting meal coverage by \"small\", \"typical\", \"larger\" carb meals. Pt requesting to start OP5. " Pended insulin, glucagon, and pod scripts to provider.    Injection/Infusion Sites: abdominal wall and thigh(s). Appropriate disposal of sharps. Appropriate storage of insulin.    Monitorng: CGM: Dexcom G6      DSME - Meal Planning and Diet Recall  Are you currently following any meal plan:  can identify carb sources. Likely can transition to ICR, but best to dose prandial on carb meal size .      Topics Covered and Impression:    DSME Topics Covered During Visit:   Glucagon Teaching  Review Glycemic Goals (CGM or SMBG)  Reviewed Hypoglycemia Signs/Symptoms/Tx Plan  Reviewed Hyperglycemia Signs/Symptoms/Tx Plan  Glycemic Pattern Management  Survival skills , reviewed OP5 system, timing of insuiln      Time: I personally spent a total of 60 minutes with the patient providing diabetes self-management education. Visit documentation will be sent electronically to referring provider.       David Gipson, DONATON, RN, [facility]

## 2024-06-23 LAB — 17OHP SERPL-MCNC: 12.26 NG/DL

## 2024-06-24 LAB
TESTOSTERONE FREE (CHAN): 1.2 PG/ML (ref 0.1–6.4)
TESTOSTERONE,TOTAL,LC-MS/MS: 18 NG/DL (ref 2–45)

## 2024-06-25 ENCOUNTER — TELEPHONE (OUTPATIENT)
Dept: OBSTETRICS AND GYNECOLOGY | Facility: CLINIC | Age: 44
End: 2024-06-25
Payer: COMMERCIAL

## 2024-06-25 NOTE — TELEPHONE ENCOUNTER
----- Message from Carlene Resendez MD sent at 6/25/2024 10:05 AM EDT -----  No urgent lab or imaging findings - to discuss at follow-up with me next month. Please let patient know

## 2024-06-25 NOTE — TELEPHONE ENCOUNTER
LVM stating no urgent lab or imaging findings and that these will be reviewed at patient appointment on 7/12 with Dr Resendez. Any issues or questions before hand to call the office back.

## 2024-06-27 ENCOUNTER — TELEPHONE (OUTPATIENT)
Dept: ENDOCRINOLOGY | Facility: CLINIC | Age: 44
End: 2024-06-27
Payer: COMMERCIAL

## 2024-06-27 NOTE — TELEPHONE ENCOUNTER
Prior Auth for omnipod intro kit pending determination  Submitted on 6/27 via cmm    KEY: [id number]        Prior Auth for pods pending determination  Submitted on 6/27 via cmm    KEY: [id number]

## 2024-07-02 ENCOUNTER — APPOINTMENT (OUTPATIENT)
Dept: RADIOLOGY | Facility: CLINIC | Age: 44
End: 2024-07-02
Payer: COMMERCIAL

## 2024-07-03 ENCOUNTER — APPOINTMENT (OUTPATIENT)
Dept: ENDOCRINOLOGY | Facility: CLINIC | Age: 44
End: 2024-07-03
Payer: COMMERCIAL

## 2024-07-09 ENCOUNTER — APPOINTMENT (OUTPATIENT)
Dept: RADIOLOGY | Facility: CLINIC | Age: 44
End: 2024-07-09
Payer: COMMERCIAL

## 2024-07-11 ENCOUNTER — APPOINTMENT (OUTPATIENT)
Dept: RADIOLOGY | Facility: CLINIC | Age: 44
End: 2024-07-11
Payer: COMMERCIAL

## 2024-07-12 ENCOUNTER — APPOINTMENT (OUTPATIENT)
Dept: OBSTETRICS AND GYNECOLOGY | Facility: CLINIC | Age: 44
End: 2024-07-12
Payer: COMMERCIAL

## 2024-07-16 ENCOUNTER — HOSPITAL ENCOUNTER (OUTPATIENT)
Dept: RADIOLOGY | Facility: CLINIC | Age: 44
Discharge: HOME | End: 2024-07-16
Payer: COMMERCIAL

## 2024-07-16 VITALS — BODY MASS INDEX: 28.71 KG/M2 | HEIGHT: 62 IN | WEIGHT: 156 LBS

## 2024-07-16 DIAGNOSIS — Z12.31 VISIT FOR SCREENING MAMMOGRAM: ICD-10-CM

## 2024-07-16 PROCEDURE — 77067 SCR MAMMO BI INCL CAD: CPT

## 2024-07-16 PROCEDURE — 77063 BREAST TOMOSYNTHESIS BI: CPT | Performed by: RADIOLOGY

## 2024-07-16 PROCEDURE — 77067 SCR MAMMO BI INCL CAD: CPT | Performed by: RADIOLOGY

## 2024-07-17 ENCOUNTER — TELEPHONE (OUTPATIENT)
Dept: ENDOCRINOLOGY | Facility: CLINIC | Age: 44
End: 2024-07-17
Payer: COMMERCIAL

## 2024-07-17 NOTE — TELEPHONE ENCOUNTER
[facility]  requesting pump settings.  Form completed and faxed back.  Will scan into chart.

## 2024-07-18 ENCOUNTER — TELEPHONE (OUTPATIENT)
Dept: OBSTETRICS AND GYNECOLOGY | Facility: CLINIC | Age: 44
End: 2024-07-18
Payer: COMMERCIAL

## 2024-07-18 NOTE — TELEPHONE ENCOUNTER
Patient notified of mammogram results.  Advised to call and schedule follow up mammogram and ultrasound.

## 2024-07-18 NOTE — TELEPHONE ENCOUNTER
----- Message from Carlene Resendez sent at 7/18/2024  6:53 AM EDT -----  Needs follow-up mammogram and ultrasound. Please let patient know. I ordered both

## 2024-08-15 ENCOUNTER — APPOINTMENT (OUTPATIENT)
Dept: RADIOLOGY | Facility: HOSPITAL | Age: 44
End: 2024-08-15
Payer: COMMERCIAL

## 2024-08-15 DIAGNOSIS — N18.5 CKD (CHRONIC KIDNEY DISEASE) STAGE 5, GFR LESS THAN 15 ML/MIN (MULTI): Primary | ICD-10-CM

## 2024-08-15 DIAGNOSIS — N18.32 CHRONIC KIDNEY DISEASE, STAGE 3B (MULTI): ICD-10-CM

## 2024-08-19 ENCOUNTER — APPOINTMENT (OUTPATIENT)
Dept: NEPHROLOGY | Facility: CLINIC | Age: 44
End: 2024-08-19
Payer: COMMERCIAL

## 2024-08-22 ENCOUNTER — APPOINTMENT (OUTPATIENT)
Dept: ENDOCRINOLOGY | Facility: CLINIC | Age: 44
End: 2024-08-22
Payer: COMMERCIAL

## 2024-08-22 VITALS — HEIGHT: 67 IN | WEIGHT: 160 LBS | BODY MASS INDEX: 25.11 KG/M2

## 2024-08-22 DIAGNOSIS — N18.6 ESRD (END STAGE RENAL DISEASE) (MULTI): ICD-10-CM

## 2024-08-22 DIAGNOSIS — E10.649 HYPOGLYCEMIA UNAWARENESS ASSOCIATED WITH TYPE 1 DIABETES MELLITUS (MULTI): ICD-10-CM

## 2024-08-22 DIAGNOSIS — E10.649 HYPOGLYCEMIA DUE TO TYPE 1 DIABETES MELLITUS (MULTI): ICD-10-CM

## 2024-08-22 DIAGNOSIS — Z96.41 INSULIN PUMP STATUS: ICD-10-CM

## 2024-08-22 DIAGNOSIS — E10.65 UNCONTROLLED TYPE 1 DIABETES MELLITUS WITH HYPERGLYCEMIA, WITH LONG-TERM CURRENT USE OF INSULIN (MULTI): Primary | ICD-10-CM

## 2024-08-22 PROCEDURE — 95251 CONT GLUC MNTR ANALYSIS I&R: CPT | Performed by: NURSE PRACTITIONER

## 2024-08-22 PROCEDURE — 3008F BODY MASS INDEX DOCD: CPT | Performed by: NURSE PRACTITIONER

## 2024-08-22 PROCEDURE — 99215 OFFICE O/P EST HI 40 MIN: CPT | Performed by: NURSE PRACTITIONER

## 2024-08-22 ASSESSMENT — PATIENT HEALTH QUESTIONNAIRE - PHQ9
1. LITTLE INTEREST OR PLEASURE IN DOING THINGS: NOT AT ALL
2. FEELING DOWN, DEPRESSED OR HOPELESS: NOT AT ALL
SUM OF ALL RESPONSES TO PHQ9 QUESTIONS 1 AND 2: 0

## 2024-08-22 NOTE — PROGRESS NOTES
Subjective   Lien Moy is a 44 y.o. female presents today for a follow up of DM Type 1.  Initial diagnosis with diabetes was age 19.   Known complications include: HPL, CKD, HTN    Last visit with me 5/30/2024  She has no showed or cancelled many appts in between  A1c 6% in 4/2023.  Previous A1c 5.8%.  Discussed this is likely falsely low due to her hypoglycemia and renal disease  Since last visit, she was on dialysis on Monday Wednesday Friday 545am-915am  Has been off dialysis now for 1 year   Thought COVID set her over the edge and needing dialysis    Has a donor, sister     Since last visit, she has transitioned to Omnipod 5  Reports at first she was having a lot of lows   This seems better now  She is happy with how level the sugars are most of the time   She is getting used to wearing the pump now     Prior to insulin pump:   Basaglar 14 units daily in afternoon  Lispro 3-6 units after meals plus sliding scale if needed with meals    Current diabetes regimen:   Lispro per Omnipod 5 insulin pump                   Patient is using continuous glucose monitor - Dexcom G6  The patient is currently checking the blood glucose as needed       Hypoglycemia frequency: 3% lows, 1% very low   Hypoglycemia awareness: Not always     The patient comes into the office today with hyperglycemia and hypoglycemia.        ROS  General: no fever, chills or acute changes in weight in the last 6 months  Skin: no rashes, pruritis or dry skin  Cardiac: denies chest pain, heart palpitations or orthopnea  Pulmonary: denies wheezing, productive cough or exertional dyspnea      Objective    Physical Exam  Unable to obtain blood pressure today due to virtual visit  General Appearance: Well appearing, alert, in no acute distress, well-hydrated, well nourished.  Affect: alert, cooperative         Current Outpatient Medications:     amLODIPine (Norvasc) 10 mg tablet, Take 1 tablet (10 mg) by mouth once daily., Disp: 90 tablet, Rfl: 1     "Basaglar KwikPen U-100 Insulin 100 unit/mL (3 mL) pen, ADMINISTER UP TO 14 UNITS UNDER THE SKIN DAILY., Disp: 15 mL, Rfl: 1    BD Ultra-Fine Short Pen Needle 31 gauge x 5/16\" needle, USE WITH INSULIN PENS FIVE TIMES DAILY, Disp: , Rfl:     buprenorphine-naloxone (Suboxone) 8-2 mg SL tablet, DISSOLVE ONE TABLET UNDER THE TONGUE TWICE DAILY, Disp: , Rfl:     calcitriol (Rocaltrol) 0.5 mcg capsule, Take 1 capsule (0.5 mcg) by mouth once daily., Disp: 30 capsule, Rfl: 5    Gvoke HypoPen 2-Pack 1 mg/0.2 mL auto-injector, Inject 1 mg under the skin if needed (Use to treat severe low blood glucose)., Disp: 0.4 mL, Rfl: 3    insulin aspart (NovoLOG) 100 unit/mL injection, Fill omnipod insulin pump every 3 days with 130 units. TDD 44 units. Take as directed per insulin pump instructions., Disp: 20 mL, Rfl: 11    Omnipod 5 G6 Intro Kit, Gen 5, cartridge, Inject 1 kit under the skin once daily., Disp: 1 each, Rfl: 0    Omnipod 5 G6 Pods, Gen 5, cartridge, Inject 1 Device under the skin every 3 days., Disp: 10 each, Rfl: 11    torsemide (Demadex) 20 mg tablet, Take 2 tablets (40 mg) by mouth once daily., Disp: 360 tablet, Rfl: 0    torsemide 40 mg tablet, Take 40 mg by mouth once daily., Disp: 60 tablet, Rfl: 2    Assessment/Plan   Uncontrolled type 1 diabetes with hyperglycemia with long-term use of insulin  Hypoglycemia due to type 1 diabetes  Hypoglycemia unawareness  End-stage renal disease:  Insulin pump status:   - A1c not up to date.  I placed labs to get with other set drawn.  She is now using OP5 and TIR is at goal however she is having lows.  Will lessen her carb ratio and ISF.  I will also lessen her basal slightly.  Short term follow up with PharmD for additional pump training/troubleshooting/adjustments.  At next visit, likely turn off reverse correction and lower target to 100/correct above 110.    Plan:   Pump changes make during visit today:   Basal   0000 = 0.4 (previously 0.5)  Carb ratio  0000 = 20 " (previously 18)   ISF:   0000 = 75 (previously 70)  Continue Dexcom G6  Follow up with PharmD in 1 month  Follow up with me in Dec as planned

## 2024-08-29 ENCOUNTER — APPOINTMENT (OUTPATIENT)
Dept: OBSTETRICS AND GYNECOLOGY | Facility: CLINIC | Age: 44
End: 2024-08-29
Payer: COMMERCIAL

## 2024-09-09 ENCOUNTER — HOSPITAL ENCOUNTER (OUTPATIENT)
Dept: RADIOLOGY | Facility: HOSPITAL | Age: 44
Discharge: HOME | End: 2024-09-09
Payer: COMMERCIAL

## 2024-09-09 ENCOUNTER — LAB (OUTPATIENT)
Dept: LAB | Facility: LAB | Age: 44
End: 2024-09-09
Payer: COMMERCIAL

## 2024-09-09 DIAGNOSIS — N64.89 BREAST ASYMMETRY: ICD-10-CM

## 2024-09-09 DIAGNOSIS — N18.32 CHRONIC KIDNEY DISEASE, STAGE 3B (MULTI): ICD-10-CM

## 2024-09-09 DIAGNOSIS — Z99.2 ESRD (END STAGE RENAL DISEASE) ON DIALYSIS (MULTI): ICD-10-CM

## 2024-09-09 DIAGNOSIS — E10.65 UNCONTROLLED TYPE 1 DIABETES MELLITUS WITH HYPERGLYCEMIA, WITH LONG-TERM CURRENT USE OF INSULIN (MULTI): ICD-10-CM

## 2024-09-09 DIAGNOSIS — N18.6 ESRD (END STAGE RENAL DISEASE) ON DIALYSIS (MULTI): ICD-10-CM

## 2024-09-09 DIAGNOSIS — N18.5 CKD (CHRONIC KIDNEY DISEASE) STAGE 5, GFR LESS THAN 15 ML/MIN (MULTI): ICD-10-CM

## 2024-09-09 LAB
ALBUMIN SERPL BCP-MCNC: 4.2 G/DL (ref 3.4–5)
ANION GAP SERPL CALC-SCNC: 15 MMOL/L (ref 10–20)
BUN SERPL-MCNC: 23 MG/DL (ref 6–23)
CALCIUM SERPL-MCNC: 9.4 MG/DL (ref 8.6–10.3)
CHLORIDE SERPL-SCNC: 101 MMOL/L (ref 98–107)
CO2 SERPL-SCNC: 28 MMOL/L (ref 21–32)
CREAT SERPL-MCNC: 2.23 MG/DL (ref 0.5–1.05)
CREAT UR-MCNC: 31 MG/DL (ref 20–320)
EGFRCR SERPLBLD CKD-EPI 2021: 27 ML/MIN/1.73M*2
EST. AVERAGE GLUCOSE BLD GHB EST-MCNC: 120 MG/DL
GLUCOSE SERPL-MCNC: 82 MG/DL (ref 74–99)
HBA1C MFR BLD: 5.8 %
MICROALBUMIN UR-MCNC: 57 MG/L
MICROALBUMIN/CREAT UR: 183.9 UG/MG CREAT
PHOSPHATE SERPL-MCNC: 4.8 MG/DL (ref 2.5–4.9)
POTASSIUM SERPL-SCNC: 4.1 MMOL/L (ref 3.5–5.3)
SODIUM SERPL-SCNC: 140 MMOL/L (ref 136–145)
TSH SERPL-ACNC: 3.6 MIU/L (ref 0.44–3.98)

## 2024-09-09 PROCEDURE — 82043 UR ALBUMIN QUANTITATIVE: CPT

## 2024-09-09 PROCEDURE — 80069 RENAL FUNCTION PANEL: CPT

## 2024-09-09 PROCEDURE — 82610 CYSTATIN C: CPT

## 2024-09-09 PROCEDURE — 77062 BREAST TOMOSYNTHESIS BI: CPT

## 2024-09-09 PROCEDURE — 82570 ASSAY OF URINE CREATININE: CPT

## 2024-09-09 PROCEDURE — 36415 COLL VENOUS BLD VENIPUNCTURE: CPT

## 2024-09-09 PROCEDURE — 77062 BREAST TOMOSYNTHESIS BI: CPT | Performed by: RADIOLOGY

## 2024-09-09 PROCEDURE — 84443 ASSAY THYROID STIM HORMONE: CPT

## 2024-09-09 PROCEDURE — 77066 DX MAMMO INCL CAD BI: CPT | Performed by: RADIOLOGY

## 2024-09-09 PROCEDURE — 83036 HEMOGLOBIN GLYCOSYLATED A1C: CPT

## 2024-09-11 LAB
CYSTATIN C SERPL-MCNC: 3.3 MG/L (ref 0.5–1.2)
GFR/BSA.PRED SERPLBLD CYS-BASED-ARV: 16 ML/MIN/BSA

## 2024-10-01 ENCOUNTER — APPOINTMENT (OUTPATIENT)
Dept: OBSTETRICS AND GYNECOLOGY | Facility: CLINIC | Age: 44
End: 2024-10-01
Payer: COMMERCIAL

## 2024-10-14 ENCOUNTER — APPOINTMENT (OUTPATIENT)
Dept: NEPHROLOGY | Facility: CLINIC | Age: 44
End: 2024-10-14
Payer: MEDICARE

## 2024-10-14 VITALS
TEMPERATURE: 97.5 F | HEART RATE: 90 BPM | WEIGHT: 164.2 LBS | DIASTOLIC BLOOD PRESSURE: 90 MMHG | OXYGEN SATURATION: 99 % | BODY MASS INDEX: 25.72 KG/M2 | SYSTOLIC BLOOD PRESSURE: 164 MMHG | RESPIRATION RATE: 18 BRPM

## 2024-10-14 DIAGNOSIS — N18.4 CKD (CHRONIC KIDNEY DISEASE) STAGE 4, GFR 15-29 ML/MIN (MULTI): Primary | ICD-10-CM

## 2024-10-14 PROCEDURE — 3060F POS MICROALBUMINURIA REV: CPT | Performed by: INTERNAL MEDICINE

## 2024-10-14 PROCEDURE — 3044F HG A1C LEVEL LT 7.0%: CPT | Performed by: INTERNAL MEDICINE

## 2024-10-14 PROCEDURE — 4010F ACE/ARB THERAPY RXD/TAKEN: CPT | Performed by: INTERNAL MEDICINE

## 2024-10-14 PROCEDURE — 99214 OFFICE O/P EST MOD 30 MIN: CPT | Performed by: INTERNAL MEDICINE

## 2024-10-14 PROCEDURE — 3077F SYST BP >= 140 MM HG: CPT | Performed by: INTERNAL MEDICINE

## 2024-10-14 PROCEDURE — 3080F DIAST BP >= 90 MM HG: CPT | Performed by: INTERNAL MEDICINE

## 2024-10-14 RX ORDER — LOSARTAN POTASSIUM 50 MG/1
50 TABLET ORAL DAILY
Qty: 90 TABLET | Refills: 3 | Status: SHIPPED | OUTPATIENT
Start: 2024-10-14 | End: 2025-10-14

## 2024-10-14 NOTE — PROGRESS NOTES
"LV in Oct 23, lost to follow up since then (no-shows and missed appt)  Has been off HD since 08/23  For follow up, accompanied by Mr. Mosqueda, doing well.  Has had much better blood sugar control with the insulin pump and CGM.  No complaints- has great energy, appetite improved, has restarted menses  No hospitalizations/illness since last visit  Not checking BP at home  Denies orthostatic symptoms    RoS negative for all other systems except as noted above.        8/24/2023     1:13 PM 9/19/2023     1:39 PM 10/30/2023    10:56 AM 6/18/2024     8:54 AM 7/16/2024     3:59 PM 8/22/2024     8:01 AM 10/14/2024    10:40 AM   Vitals   Systolic 153 158 192 163   164   Diastolic 95 90 106 92   90   Heart Rate 93  97    90   Temp   36.6 °C (97.8 °F)    36.4 °C (97.5 °F)   Resp   18    18   Height (in) 1.702 m (5' 7\") 1.702 m (5' 7\") 1.702 m (5' 7\") 1.702 m (5' 7\") 1.575 m (5' 2\") 1.702 m (5' 7\")    Weight (lb) 156 158 160 156.2 156 160 164.2   BMI 24.43 kg/m2 24.75 kg/m2 25.06 kg/m2 24.46 kg/m2 28.53 kg/m2 25.06 kg/m2 25.72 kg/m2   BSA (m2) 1.83 m2 1.84 m2 1.85 m2 1.83 m2 1.76 m2 1.85 m2 1.88 m2   Visit Report  Report Report Report   Report     No distress  HEENT:  moist, no pallor  No edema el LE  Breath sounds el equal, clear  S1 S2 regular, normal, no rub or murmur  Abdomen soft  AAO x3, non focal    Lab Results   Component Value Date     09/09/2024     02/20/2024     10/23/2023    K 4.1 09/09/2024    K 3.6 02/20/2024    K 3.8 10/23/2023     09/09/2024     02/20/2024     10/23/2023    CO2 28 09/09/2024    CO2 26 02/20/2024    CO2 29 10/23/2023    BUN 23 09/09/2024    BUN 15 02/20/2024    BUN 14 10/23/2023    BUN 14 10/23/2023    CREATININE 2.23 (H) 09/09/2024    CREATININE 2.24 (H) 02/20/2024    CREATININE 2.26 (H) 10/23/2023    CALCIUM 9.4 09/09/2024    CALCIUM 9.4 02/20/2024    CALCIUM 9.0 10/23/2023    PHOS 4.8 09/09/2024    PHOS 2.9 02/20/2024    PHOS 3.0 10/23/2023    VITD25 28 (L) " 02/20/2024    VITD25 31 10/23/2023    VITD25 46 02/15/2022    MICROALBCREA 183.9 (H) 09/09/2024    MICROALBCREA 91.1 (H) 11/22/2021    MICROALBCREA 83.1 (H) 04/26/2021    HGB 12.2 02/20/2024    HGB 11.6 (L) 06/06/2023    HGB 8.7 (L) 04/15/2023      44-year-old with history of type 1 diabetes, CKD previously on dialysis following COVID infection, now has been off dialysis since about 1 year.    # CKD G4 A3: Patient's creatinine on 9/9/2024 was 2.2 mg per DL with EGFR 27 mL/min, simultaneous Cystatin C GFR was 16 mL/min.  Patient's true GFR is likely somewhere in the middle, possibly close to 20 mL/min.  She continues to feel well without any uremic symptoms.  Continue to monitor off dialysis.  Not a candidate for SGLT2 inhibitor therapy due to type 1 diabetes.  Patient asked about preemptive transplant, I encouraged her to resume follow-up with transplant clinic.  Discussed better outcomes with preemptive transplant.  Her sister continues to be a potential donor.  Continue to avoid NSAIDs.    # Hypertension: Goal blood pressure 120/80, states blood pressure at home is close to 120/80.  Advised her to bring in home cuff for verification, continue to monitor blood pressure at home.  Continue amlodipine 10 mg/day, losartan 50 mg/day, continue 2.5 g/day sodium restricted diet.    RTC: 4 months

## 2024-10-29 ENCOUNTER — APPOINTMENT (OUTPATIENT)
Dept: OBSTETRICS AND GYNECOLOGY | Facility: CLINIC | Age: 44
End: 2024-10-29
Payer: COMMERCIAL

## 2024-11-06 ENCOUNTER — TELEPHONE (OUTPATIENT)
Dept: ENDOCRINOLOGY | Facility: CLINIC | Age: 44
End: 2024-11-06
Payer: MEDICARE

## 2024-11-06 DIAGNOSIS — E10.65 UNCONTROLLED TYPE 1 DIABETES MELLITUS WITH HYPERGLYCEMIA, WITH LONG-TERM CURRENT USE OF INSULIN: Primary | ICD-10-CM

## 2024-11-11 ENCOUNTER — APPOINTMENT (OUTPATIENT)
Dept: ENDOCRINOLOGY | Facility: CLINIC | Age: 44
End: 2024-11-11
Payer: MEDICARE

## 2024-11-14 RX ORDER — INSULIN PMP CART,AUT,G6/7,CNTR
1 EACH SUBCUTANEOUS
Qty: 30 EACH | Refills: 3 | Status: SHIPPED | OUTPATIENT
Start: 2024-11-14

## 2024-11-14 NOTE — TELEPHONE ENCOUNTER
Orders Placed This Encounter      insulin pump cart,auto,BT,G6/7 (Omnipod 5 G6-G7 Pods, Gen 5,) cartridge

## 2024-11-14 NOTE — TELEPHONE ENCOUNTER
Can we send for the updated G6/G7 pods to her pharmacy? She uses [facility] in [place]. Her insurance recently changed and I'll have to do a new PA, but the old G6 pods are no longer an option for auth.     Also, we'll be switching her to TMS for her CGM because she now has [employer]

## 2024-11-15 NOTE — TELEPHONE ENCOUNTER
Digital form received from [facility]. Electronically signed and returned.

## 2024-11-21 ENCOUNTER — APPOINTMENT (OUTPATIENT)
Dept: NEPHROLOGY | Facility: CLINIC | Age: 44
End: 2024-11-21
Payer: COMMERCIAL

## 2024-11-27 ENCOUNTER — TELEPHONE (OUTPATIENT)
Dept: ENDOCRINOLOGY | Facility: CLINIC | Age: 44
End: 2024-11-27
Payer: MEDICARE

## 2024-11-27 NOTE — TELEPHONE ENCOUNTER
Patient of [facility] that is having trouble connecting the G7 sensors to omnipod. Please call her  Panda at [phone] ASAP    G7 is new to her

## 2024-11-27 NOTE — TELEPHONE ENCOUNTER
Called again at 12:31 stating they are still having considerable issues getting the sensor to connect with her omnipod

## 2024-12-04 ENCOUNTER — APPOINTMENT (OUTPATIENT)
Dept: ENDOCRINOLOGY | Facility: CLINIC | Age: 44
End: 2024-12-04
Payer: MEDICARE

## 2024-12-10 DIAGNOSIS — N18.6 ESRD (END STAGE RENAL DISEASE) ON DIALYSIS (MULTI): ICD-10-CM

## 2024-12-10 DIAGNOSIS — Z99.2 ESRD (END STAGE RENAL DISEASE) ON DIALYSIS (MULTI): ICD-10-CM

## 2024-12-12 ENCOUNTER — TELEPHONE (OUTPATIENT)
Dept: PULMONOLOGY | Facility: HOSPITAL | Age: 44
End: 2024-12-12
Payer: MEDICARE

## 2024-12-12 NOTE — TELEPHONE ENCOUNTER
Informed the patient that Dr. Rivas sent in a prescription for Torsemide 40mg.  She should take this for 5 days and if this doesn't decrease the swelling she should call the office and schedule an appointment with Dr. Rivas.

## 2024-12-18 ENCOUNTER — APPOINTMENT (OUTPATIENT)
Dept: ENDOCRINOLOGY | Facility: CLINIC | Age: 44
End: 2024-12-18
Payer: COMMERCIAL

## 2024-12-19 ENCOUNTER — APPOINTMENT (OUTPATIENT)
Dept: NEPHROLOGY | Facility: CLINIC | Age: 44
End: 2024-12-19
Payer: COMMERCIAL

## 2024-12-30 DIAGNOSIS — Z99.2 ESRD (END STAGE RENAL DISEASE) ON DIALYSIS (MULTI): ICD-10-CM

## 2024-12-30 DIAGNOSIS — N18.6 ESRD (END STAGE RENAL DISEASE) ON DIALYSIS (MULTI): ICD-10-CM

## 2024-12-30 RX ORDER — TORSEMIDE 20 MG/1
40 TABLET ORAL DAILY
Qty: 60 TABLET | Refills: 1 | Status: SHIPPED | OUTPATIENT
Start: 2024-12-30 | End: 2025-02-28

## 2025-01-21 DIAGNOSIS — N18.6 ESRD (END STAGE RENAL DISEASE) ON DIALYSIS (MULTI): ICD-10-CM

## 2025-01-21 DIAGNOSIS — Z99.2 ESRD (END STAGE RENAL DISEASE) ON DIALYSIS (MULTI): ICD-10-CM

## 2025-01-30 ENCOUNTER — APPOINTMENT (OUTPATIENT)
Dept: DERMATOLOGY | Facility: CLINIC | Age: 45
End: 2025-01-30
Payer: COMMERCIAL

## 2025-01-30 DIAGNOSIS — N18.4 CKD (CHRONIC KIDNEY DISEASE) STAGE 4, GFR 15-29 ML/MIN (MULTI): Primary | ICD-10-CM

## 2025-01-30 LAB
25(OH)D3+25(OH)D2 SERPL-MCNC: 23 NG/ML (ref 30–100)
ALBUMIN SERPL-MCNC: 3.9 G/DL (ref 3.6–5.1)
ALBUMIN/CREAT UR: 21 MG/G CREAT
BUN SERPL-MCNC: 21 MG/DL (ref 7–25)
BUN/CREAT SERPL: 11 (CALC) (ref 6–22)
CALCIUM SERPL-MCNC: 9.5 MG/DL (ref 8.6–10.2)
CHLORIDE SERPL-SCNC: 103 MMOL/L (ref 98–110)
CO2 SERPL-SCNC: 25 MMOL/L (ref 20–32)
CREAT SERPL-MCNC: 1.92 MG/DL (ref 0.5–0.99)
CREAT UR-MCNC: 128 MG/DL (ref 20–275)
EGFRCR SERPLBLD CKD-EPI 2021: 33 ML/MIN/1.73M2
GLUCOSE SERPL-MCNC: 127 MG/DL (ref 65–139)
MICROALBUMIN UR-MCNC: 2.7 MG/DL
PHOSPHATE SERPL-MCNC: 3.6 MG/DL (ref 2.5–4.5)
POTASSIUM SERPL-SCNC: 4.4 MMOL/L (ref 3.5–5.3)
PTH-INTACT SERPL-MCNC: NORMAL PG/ML
SODIUM SERPL-SCNC: 138 MMOL/L (ref 135–146)

## 2025-01-30 RX ORDER — ERGOCALCIFEROL 1.25 MG/1
50000 CAPSULE ORAL WEEKLY
Qty: 8 CAPSULE | Refills: 0 | Status: SHIPPED | OUTPATIENT
Start: 2025-01-30 | End: 2025-03-27

## 2025-01-31 ENCOUNTER — TELEPHONE (OUTPATIENT)
Dept: PULMONOLOGY | Facility: HOSPITAL | Age: 45
End: 2025-01-31
Payer: MEDICARE

## 2025-01-31 NOTE — TELEPHONE ENCOUNTER
Per Dr. Rivas, this nurse called the patient and explained that her kidney function is stable but her vitamin D is low.  Dr. Rivas sent a prescription for vitamin D, take 50,000 units weekly for 8 weeks.  Patient expressed understanding and appreciation for the call.

## 2025-02-03 ENCOUNTER — TELEPHONE (OUTPATIENT)
Dept: PULMONOLOGY | Facility: HOSPITAL | Age: 45
End: 2025-02-03
Payer: MEDICARE

## 2025-02-03 DIAGNOSIS — N18.4 CKD (CHRONIC KIDNEY DISEASE) STAGE 4, GFR 15-29 ML/MIN (MULTI): Primary | ICD-10-CM

## 2025-02-03 NOTE — TELEPHONE ENCOUNTER
Informed the patient that per Dr. Rivas, there is no need to repeat any testing.  Patient expressed understanding and appreciation for the call.

## 2025-03-03 ENCOUNTER — APPOINTMENT (OUTPATIENT)
Dept: NEPHROLOGY | Facility: CLINIC | Age: 45
End: 2025-03-03
Payer: MEDICARE

## 2025-03-10 ENCOUNTER — APPOINTMENT (OUTPATIENT)
Dept: NEPHROLOGY | Facility: CLINIC | Age: 45
End: 2025-03-10
Payer: MEDICARE

## 2025-04-03 ENCOUNTER — APPOINTMENT (OUTPATIENT)
Dept: ENDOCRINOLOGY | Facility: CLINIC | Age: 45
End: 2025-04-03
Payer: MEDICARE

## 2025-04-03 ENCOUNTER — TELEPHONE (OUTPATIENT)
Dept: ENDOCRINOLOGY | Facility: CLINIC | Age: 45
End: 2025-04-03

## 2025-04-03 NOTE — TELEPHONE ENCOUNTER
This nurse called the patient to confirm the virtual appointment scheduled for today and to conduct the rooming process. There was no answer; a voicemail was left for the patient.

## 2025-04-28 ENCOUNTER — APPOINTMENT (OUTPATIENT)
Dept: ENDOCRINOLOGY | Facility: CLINIC | Age: 45
End: 2025-04-28
Payer: MEDICARE

## 2025-04-28 VITALS
TEMPERATURE: 97.7 F | HEIGHT: 67 IN | DIASTOLIC BLOOD PRESSURE: 72 MMHG | WEIGHT: 170.7 LBS | BODY MASS INDEX: 26.79 KG/M2 | HEART RATE: 89 BPM | SYSTOLIC BLOOD PRESSURE: 139 MMHG

## 2025-04-28 DIAGNOSIS — E10.65 UNCONTROLLED TYPE 1 DIABETES MELLITUS WITH HYPERGLYCEMIA, WITH LONG-TERM CURRENT USE OF INSULIN: Primary | ICD-10-CM

## 2025-04-28 DIAGNOSIS — E10.649 HYPOGLYCEMIA UNAWARENESS ASSOCIATED WITH TYPE 1 DIABETES MELLITUS: ICD-10-CM

## 2025-04-28 DIAGNOSIS — Z96.41 INSULIN PUMP STATUS: ICD-10-CM

## 2025-04-28 DIAGNOSIS — E10.22 TYPE 1 DIABETES MELLITUS WITH STAGE 4 CHRONIC KIDNEY DISEASE (MULTI): ICD-10-CM

## 2025-04-28 DIAGNOSIS — N18.4 TYPE 1 DIABETES MELLITUS WITH STAGE 4 CHRONIC KIDNEY DISEASE (MULTI): ICD-10-CM

## 2025-04-28 LAB — POC HEMOGLOBIN A1C: 6 % (ref 4.2–6.5)

## 2025-04-28 PROCEDURE — G2211 COMPLEX E/M VISIT ADD ON: HCPCS | Performed by: NURSE PRACTITIONER

## 2025-04-28 PROCEDURE — 4010F ACE/ARB THERAPY RXD/TAKEN: CPT | Performed by: NURSE PRACTITIONER

## 2025-04-28 PROCEDURE — 83036 HEMOGLOBIN GLYCOSYLATED A1C: CPT | Performed by: NURSE PRACTITIONER

## 2025-04-28 PROCEDURE — 3075F SYST BP GE 130 - 139MM HG: CPT | Performed by: NURSE PRACTITIONER

## 2025-04-28 PROCEDURE — 3044F HG A1C LEVEL LT 7.0%: CPT | Performed by: NURSE PRACTITIONER

## 2025-04-28 PROCEDURE — 95251 CONT GLUC MNTR ANALYSIS I&R: CPT | Performed by: NURSE PRACTITIONER

## 2025-04-28 PROCEDURE — 3078F DIAST BP <80 MM HG: CPT | Performed by: NURSE PRACTITIONER

## 2025-04-28 PROCEDURE — 99215 OFFICE O/P EST HI 40 MIN: CPT | Performed by: NURSE PRACTITIONER

## 2025-04-28 PROCEDURE — 3008F BODY MASS INDEX DOCD: CPT | Performed by: NURSE PRACTITIONER

## 2025-04-28 RX ORDER — BLOOD-GLUCOSE METER
EACH MISCELLANEOUS
Qty: 150 STRIP | Refills: 11 | Status: SHIPPED | OUTPATIENT
Start: 2025-04-28

## 2025-04-28 RX ORDER — INSULIN ASPART 100 [IU]/ML
INJECTION, SOLUTION INTRAVENOUS; SUBCUTANEOUS
Qty: 20 ML | Refills: 11 | Status: SHIPPED | OUTPATIENT
Start: 2025-04-28

## 2025-04-28 ASSESSMENT — ENCOUNTER SYMPTOMS
LOSS OF SENSATION IN FEET: 0
DEPRESSION: 0
OCCASIONAL FEELINGS OF UNSTEADINESS: 0

## 2025-04-28 ASSESSMENT — PAIN SCALES - GENERAL: PAINLEVEL_OUTOF10: 0-NO PAIN

## 2025-04-28 ASSESSMENT — PATIENT HEALTH QUESTIONNAIRE - PHQ9
SUM OF ALL RESPONSES TO PHQ9 QUESTIONS 1 AND 2: 0
1. LITTLE INTEREST OR PLEASURE IN DOING THINGS: NOT AT ALL
2. FEELING DOWN, DEPRESSED OR HOPELESS: NOT AT ALL

## 2025-04-28 NOTE — PROGRESS NOTES
"Subjective   Lien Moy is a 44 y.o. female presents today for a follow up of DM Type 1.  Initial diagnosis with diabetes was age 19.   Known complications include: HPL, CKD, HTN    Last visit with me 8/2024  She has no showed or cancelled many appts in between  A1c today 6%.  Previous A1c 5.8% in 9/2024.    Since last visit, is continuing with Omnipod 5  She reports being out of Dexcom and having issues with a couple   Dexcom is sending her some but currently doing fingersticks   Needs script for test strips   She feels sugars have been higher   She reports her appetite is back and her weight is up   Still follows with nephrology  Reports her kidney function was good so she cannot even be on the transplant list at this time    Prior to insulin pump:   Basaglar 14 units daily in afternoon  Lispro 3-6 units after meals plus sliding scale if needed with meals    Current diabetes regimen:   Lispro per Omnipod 5 insulin pump                   Patient is using continuous glucose monitor - Dexcom G7  The patient is currently checking the blood glucose as needed       Hypoglycemia frequency: 2% lows, 0% very low   Hypoglycemia awareness: Not always     The patient comes into the office today with hyperglycemia.       ROS  General: no fever, chills or acute changes in weight in the last 6 months  Skin: no rashes, pruritis or dry skin  Cardiac: denies chest pain, heart palpitations or orthopnea  Pulmonary: denies wheezing, productive cough or exertional dyspnea      Objective    Physical Exam  Blood pressure 139/72, pulse 89, temperature 36.5 °C (97.7 °F), temperature source Oral, height 1.702 m (5' 7\"), weight 77.4 kg (170 lb 11.2 oz).  General: not in acute distress, cooperative   Respiratory: normal respiratory effort  Musculoskeletal: normal gait           Current Outpatient Medications:     amLODIPine (Norvasc) 10 mg tablet, Take 1 tablet (10 mg) by mouth once daily., Disp: 90 tablet, Rfl: 1    Basaglar KwikPen " "U-100 Insulin 100 unit/mL (3 mL) pen, ADMINISTER UP TO 14 UNITS UNDER THE SKIN DAILY., Disp: 15 mL, Rfl: 1    BD Ultra-Fine Short Pen Needle 31 gauge x 5/16\" needle, USE WITH INSULIN PENS FIVE TIMES DAILY, Disp: , Rfl:     buprenorphine-naloxone (Suboxone) 8-2 mg SL tablet, DISSOLVE ONE TABLET UNDER THE TONGUE TWICE DAILY, Disp: , Rfl:     calcitriol (Rocaltrol) 0.5 mcg capsule, Take 1 capsule (0.5 mcg) by mouth once daily., Disp: 30 capsule, Rfl: 5    Gvoke HypoPen 2-Pack 1 mg/0.2 mL auto-injector, Inject 1 mg under the skin if needed (Use to treat severe low blood glucose)., Disp: 0.4 mL, Rfl: 3    insulin aspart (NovoLOG) 100 unit/mL injection, Fill omnipod insulin pump every 3 days with 130 units. TDD 44 units. Take as directed per insulin pump instructions., Disp: 20 mL, Rfl: 11    insulin pump cart,auto,BT,G6/7 (Omnipod 5 G6-G7 Pods, Gen 5,) cartridge, Inject 1 each under the skin every 3 days., Disp: 30 each, Rfl: 3    losartan (Cozaar) 50 mg tablet, Take 1 tablet (50 mg) by mouth once daily., Disp: 90 tablet, Rfl: 3    torsemide 40 mg tablet, Take 40 mg by mouth once daily for 5 days., Disp: 5 tablet, Rfl: 0    torsemide 40 mg tablet, Take 40 mg by mouth once daily., Disp: 60 tablet, Rfl: 3    Assessment/Plan   Uncontrolled type 1 diabetes with hyperglycemia with long-term use of insulin  Hypoglycemia unawareness  Type 1 diabetes with CKD stage 4  Insulin pump status:   - A1c at goal.  She is having lows but overall less.  She will occasionally put in \"fake carbs\"  Reminded her how to correct without having to put in fake carbs.  She will try to use sensor button for correction if needed.  Her carb ratio and ISF appear to be ok.  Will change her target to 110 today.  Now its 120 and we will start there.  Reverse correction also turned off.  Reminded her to bolus for all meals.  She may miss boluses.  Reinforced using sensor button with santosh and correction.  Reinforced getting dilated eye exam with her retina " specialist.  Appears she got lost to follow up.     Plan:   Pump settings changed in office today:   Changed your target to 110  Correct above threshold to 110  Remember to dose prior to meals   If you remain high after meals, please use sensor button to pull in blood sugar and give small amount of insulin to correct   Follow up in 6 months    Schedule diabetes dilated eye exam

## 2025-04-28 NOTE — PATIENT INSTRUCTIONS
Pump settings changed in office today:   Changed your target to 110  Correct above threshold to 110    Remember to dose prior to meals     If you remain high after meals, please use sensor button to pull in blood sugar and give small amount of insulin to correct     Follow up in 6 months      Schedule diabetes dilated eye exam

## 2025-05-02 DIAGNOSIS — E10.65 UNCONTROLLED TYPE 1 DIABETES MELLITUS WITH HYPERGLYCEMIA, WITH LONG-TERM CURRENT USE OF INSULIN: ICD-10-CM

## 2025-05-12 ENCOUNTER — TELEPHONE (OUTPATIENT)
Dept: ENDOCRINOLOGY | Facility: CLINIC | Age: 45
End: 2025-05-12
Payer: MEDICARE

## 2025-05-12 NOTE — TELEPHONE ENCOUNTER
Received a DWO from [employer] on 5/12  Completed and Faxed back to [phone] and locally to [phone] on 5/12

## 2025-05-15 ENCOUNTER — APPOINTMENT (OUTPATIENT)
Dept: NEPHROLOGY | Facility: CLINIC | Age: 45
End: 2025-05-15
Payer: MEDICARE

## 2025-05-15 VITALS
SYSTOLIC BLOOD PRESSURE: 133 MMHG | HEART RATE: 74 BPM | WEIGHT: 174 LBS | DIASTOLIC BLOOD PRESSURE: 86 MMHG | BODY MASS INDEX: 27.25 KG/M2

## 2025-05-15 DIAGNOSIS — N18.4 CKD (CHRONIC KIDNEY DISEASE) STAGE 4, GFR 15-29 ML/MIN (MULTI): Primary | ICD-10-CM

## 2025-05-15 DIAGNOSIS — N18.32 CHRONIC KIDNEY DISEASE, STAGE 3B (MULTI): ICD-10-CM

## 2025-05-15 PROCEDURE — 3075F SYST BP GE 130 - 139MM HG: CPT | Performed by: INTERNAL MEDICINE

## 2025-05-15 PROCEDURE — 3079F DIAST BP 80-89 MM HG: CPT | Performed by: INTERNAL MEDICINE

## 2025-05-15 PROCEDURE — 4010F ACE/ARB THERAPY RXD/TAKEN: CPT | Performed by: INTERNAL MEDICINE

## 2025-05-15 PROCEDURE — 3044F HG A1C LEVEL LT 7.0%: CPT | Performed by: INTERNAL MEDICINE

## 2025-05-15 PROCEDURE — 99214 OFFICE O/P EST MOD 30 MIN: CPT | Performed by: INTERNAL MEDICINE

## 2025-05-15 NOTE — PROGRESS NOTES
"For follow up, doing well.  No complaints except wt gain- thinks its related to menopause, mainly around mid-section. No SoB/leg swelling  No hospitalizations/illness since last visit  Not checking BP at home  Denies orthostatic symptoms    RoS negative for all other systems except as noted above.        7/16/2024     3:59 PM 8/22/2024     8:01 AM 10/14/2024    10:40 AM 4/28/2025     9:49 AM 5/15/2025     2:17 PM 5/15/2025     2:18 PM 5/15/2025     2:19 PM   Vitals   Systolic   164 139 140 141 133   Diastolic   90 72 87 89 86   BP Location    Right arm Right arm Right arm Right arm   Heart Rate   90 89 73 75 74   Temp   36.4 °C (97.5 °F) 36.5 °C (97.7 °F)      Resp   18       Height 1.575 m (5' 2\") 1.702 m (5' 7\")  1.702 m (5' 7\")      Weight (lb) 156 160 164.2 170.7 174     BMI 28.53 kg/m2 25.06 kg/m2 25.72 kg/m2 26.74 kg/m2 27.25 kg/m2     BSA (m2) 1.76 m2 1.85 m2 1.88 m2 1.91 m2 1.93 m2     Visit Report   Report Report Report Report Report     No distress  HEENT:  moist, no pallor  Trace edema el LE. No thrill/bruit in RUE AVG  Breath sounds el equal, clear  S1 S2 regular, normal, no rub or murmur  Abdomen soft  AAO x3, non focal    Lab Results   Component Value Date     01/29/2025     09/09/2024     02/20/2024    K 4.4 01/29/2025    K 4.1 09/09/2024    K 3.6 02/20/2024     01/29/2025     09/09/2024     02/20/2024    CO2 25 01/29/2025    CO2 28 09/09/2024    CO2 26 02/20/2024    BUN 21 01/29/2025    BUN 23 09/09/2024    BUN 15 02/20/2024    CREATININE 1.92 (H) 01/29/2025    CREATININE 2.23 (H) 09/09/2024    CREATININE 2.24 (H) 02/20/2024    CALCIUM 9.5 01/29/2025    CALCIUM 9.4 09/09/2024    CALCIUM 9.4 02/20/2024    PHOS 3.6 01/29/2025    PHOS 4.8 09/09/2024    PHOS 2.9 02/20/2024    VITD25 23 (L) 01/29/2025    VITD25 28 (L) 02/20/2024    VITD25 31 10/23/2023    MICROALBCREA 21 01/29/2025    MICROALBCREA 183.9 (H) 09/09/2024    MICROALBCREA 91.1 (H) 11/22/2021    HGB 12.2 " "02/20/2024    HGB 11.6 (L) 06/06/2023    HGB 8.7 (L) 04/15/2023      Current Outpatient Medications   Medication Instructions    amLODIPine (NORVASC) 10 mg, oral, Daily    Basaglar KwikPen U-100 Insulin 100 unit/mL (3 mL) pen ADMINISTER UP TO 14 UNITS UNDER THE SKIN DAILY.    BD Ultra-Fine Short Pen Needle 31 gauge x 5/16\" needle USE WITH INSULIN PENS FIVE TIMES DAILY    blood sugar diagnostic (OneTouch Verio test strips) Use in case of CGM failure to check blood sugar 4 times per day    buprenorphine-naloxone (Suboxone) 8-2 mg SL tablet DISSOLVE ONE TABLET UNDER THE TONGUE TWICE DAILY    calcitriol (ROCALTROL) 0.5 mcg, oral, Daily    Gvoke HypoPen 2-Pack 1 mg, subcutaneous, As needed    insulin aspart (NovoLOG) 100 unit/mL injection Fill omnipod insulin pump every 3 days with 130 units. TDD 44 units. Take as directed per insulin pump instructions.    insulin pump cart,auto,BT,G6/7 (Omnipod 5 G6-G7 Pods, Gen 5,) cartridge 1 each, subcutaneous, Every 3 days    losartan (COZAAR) 50 mg, oral, Daily    torsemide 40 mg, oral, Daily    torsemide 40 mg, oral, Daily     44-year-old with history of type 1 diabetes, CKD previously on dialysis following COVID infection, now has been off dialysis since about July 2023     # CKD G4 A3: Patient's creatinine on 1/9/25 was 1.9 mg per DL with EGFR 33 mL/min, will repeat RFP and cystatin C GFR.  Her sister continues to be a potential donor but not eligible to be listed due to high GFR.  Continue to avoid NSAIDs.     # Hypertension: Goal blood pressure 120/80, states blood pressure at home is close to 120/80. Continue amlodipine 10 mg/day, losartan 50 mg/day, continue 2.5 g/day sodium restricted diet.     RTC: 4 months        "

## 2025-05-16 LAB — PTH-INTACT SERPL-MCNC: 151 PG/ML (ref 16–77)

## 2025-05-16 NOTE — PATIENT INSTRUCTIONS
You have chronic kidney disease stage III. Avoid over the counter pain medications like Ibuprofen, naproxen, meloxicam, diclofenac, sulindac etc (NSAIDs).Tylenol is OK to use.Avoid IV contrast dye.Low sodium diet. BP goal less than 130/80 mm Hg.Tell all your health care providers you have chronic kidney disease stage III.Check labs 1 week before next visit.

## 2025-05-17 LAB
CHOLEST SERPL-MCNC: 178 MG/DL
CHOLEST/HDLC SERPL: 3.7 (CALC)
HDLC SERPL-MCNC: 48 MG/DL
LDLC SERPL CALC-MCNC: 105 MG/DL (CALC)
NONHDLC SERPL-MCNC: 130 MG/DL (CALC)
TRIGL SERPL-MCNC: 136 MG/DL
TTG IGA SER-ACNC: <1 U/ML

## 2025-05-28 DIAGNOSIS — N18.4 CKD (CHRONIC KIDNEY DISEASE) STAGE 4, GFR 15-29 ML/MIN (MULTI): Primary | ICD-10-CM

## 2025-05-28 RX ORDER — TORSEMIDE 20 MG/1
40 TABLET ORAL DAILY
Qty: 180 TABLET | Refills: 3 | Status: SHIPPED | OUTPATIENT
Start: 2025-05-28 | End: 2026-05-28

## 2025-06-16 ENCOUNTER — TELEPHONE (OUTPATIENT)
Dept: ENDOCRINOLOGY | Facility: CLINIC | Age: 45
End: 2025-06-16
Payer: MEDICARE

## 2025-06-16 DIAGNOSIS — E10.65 UNCONTROLLED TYPE 1 DIABETES MELLITUS WITH HYPERGLYCEMIA, WITH LONG-TERM CURRENT USE OF INSULIN: ICD-10-CM

## 2025-06-16 RX ORDER — INSULIN PMP CART,AUT,G6/7,CNTR
1 EACH SUBCUTANEOUS
Qty: 30 EACH | Refills: 3 | Status: SHIPPED | OUTPATIENT
Start: 2025-06-16

## 2025-06-16 NOTE — TELEPHONE ENCOUNTER
Patient's  called for refill on pods. Also stated pods will need a pa. Provider covering for Faith Mahan informed.

## 2025-06-16 NOTE — TELEPHONE ENCOUNTER
Prior Auth for pods pending determination  Submitted on 6/16 via epic    Previous PA on file was valid for patient's lifetime. Appears she now only has traditional Medicare and no longer covered under [employer].

## 2025-08-17 LAB
ALBUMIN SERPL-MCNC: 3.6 G/DL (ref 3.6–5.1)
ALBUMIN/CREAT UR: NORMAL
BUN SERPL-MCNC: 17 MG/DL (ref 7–25)
BUN/CREAT SERPL: 9 (CALC) (ref 6–22)
CALCIUM SERPL-MCNC: 8.5 MG/DL (ref 8.6–10.2)
CHLORIDE SERPL-SCNC: 105 MMOL/L (ref 98–110)
CO2 SERPL-SCNC: 23 MMOL/L (ref 20–32)
CREAT SERPL-MCNC: 1.95 MG/DL (ref 0.5–0.99)
CREAT UR-MCNC: NORMAL MG/DL
CYSTATIN C SERPL-MCNC: NORMAL MG/L
EGFRCR SERPLBLD CKD-EPI 2021: 32 ML/MIN/1.73M2
GFR/BSA.PRED SERPLBLD CYS-BASED-ARV: NORMAL ML/MIN/{1.73_M2}
GLUCOSE SERPL-MCNC: 130 MG/DL (ref 65–99)
MICROALBUMIN UR-MCNC: NORMAL
PHOSPHATE SERPL-MCNC: 3.4 MG/DL (ref 2.5–4.5)
POTASSIUM SERPL-SCNC: 4.6 MMOL/L (ref 3.5–5.3)
SODIUM SERPL-SCNC: 136 MMOL/L (ref 135–146)

## 2025-08-18 ENCOUNTER — APPOINTMENT (OUTPATIENT)
Dept: NEPHROLOGY | Facility: CLINIC | Age: 45
End: 2025-08-18
Payer: MEDICARE

## 2025-08-18 VITALS
WEIGHT: 171.4 LBS | SYSTOLIC BLOOD PRESSURE: 158 MMHG | HEIGHT: 67 IN | DIASTOLIC BLOOD PRESSURE: 89 MMHG | BODY MASS INDEX: 26.9 KG/M2

## 2025-08-18 DIAGNOSIS — N18.4 CKD (CHRONIC KIDNEY DISEASE) STAGE 4, GFR 15-29 ML/MIN (MULTI): ICD-10-CM

## 2025-08-18 DIAGNOSIS — Z99.2 ESRD (END STAGE RENAL DISEASE) ON DIALYSIS (MULTI): ICD-10-CM

## 2025-08-18 DIAGNOSIS — N18.6 ESRD (END STAGE RENAL DISEASE) ON DIALYSIS (MULTI): ICD-10-CM

## 2025-08-18 PROCEDURE — 4010F ACE/ARB THERAPY RXD/TAKEN: CPT | Performed by: INTERNAL MEDICINE

## 2025-08-18 PROCEDURE — 3008F BODY MASS INDEX DOCD: CPT | Performed by: INTERNAL MEDICINE

## 2025-08-18 PROCEDURE — 3077F SYST BP >= 140 MM HG: CPT | Performed by: INTERNAL MEDICINE

## 2025-08-18 PROCEDURE — 99214 OFFICE O/P EST MOD 30 MIN: CPT | Performed by: INTERNAL MEDICINE

## 2025-08-18 PROCEDURE — 3044F HG A1C LEVEL LT 7.0%: CPT | Performed by: INTERNAL MEDICINE

## 2025-08-18 PROCEDURE — 3079F DIAST BP 80-89 MM HG: CPT | Performed by: INTERNAL MEDICINE

## 2025-08-18 RX ORDER — CALCITRIOL 0.25 UG/1
0.25 CAPSULE ORAL DAILY
Qty: 90 CAPSULE | Refills: 3 | Status: SHIPPED | OUTPATIENT
Start: 2025-08-18 | End: 2026-08-18

## 2025-08-18 RX ORDER — LOSARTAN POTASSIUM 100 MG/1
100 TABLET ORAL DAILY
Qty: 90 TABLET | Refills: 3 | Status: SHIPPED | OUTPATIENT
Start: 2025-08-18 | End: 2026-08-18

## 2025-08-20 LAB
ALBUMIN SERPL-MCNC: 3.6 G/DL (ref 3.6–5.1)
ALBUMIN/CREAT UR: 35 MG/G CREAT
BUN SERPL-MCNC: 17 MG/DL (ref 7–25)
BUN/CREAT SERPL: 9 (CALC) (ref 6–22)
CALCIUM SERPL-MCNC: 8.5 MG/DL (ref 8.6–10.2)
CHLORIDE SERPL-SCNC: 105 MMOL/L (ref 98–110)
CO2 SERPL-SCNC: 23 MMOL/L (ref 20–32)
CREAT SERPL-MCNC: 1.95 MG/DL (ref 0.5–0.99)
CREAT UR-MCNC: 143 MG/DL (ref 20–275)
CYSTATIN C SERPL-MCNC: 2.73 MG/L (ref 0.52–1.21)
EGFRCR SERPLBLD CKD-EPI 2021: 32 ML/MIN/1.73M2
GFR/BSA.PRED SERPLBLD CYS-BASED-ARV: 20 ML/MIN/1.73M2
GLUCOSE SERPL-MCNC: 130 MG/DL (ref 65–99)
MICROALBUMIN UR-MCNC: 5 MG/DL
PHOSPHATE SERPL-MCNC: 3.4 MG/DL (ref 2.5–4.5)
POTASSIUM SERPL-SCNC: 4.6 MMOL/L (ref 3.5–5.3)
SODIUM SERPL-SCNC: 136 MMOL/L (ref 135–146)

## 2025-08-26 ENCOUNTER — TELEPHONE (OUTPATIENT)
Dept: ENDOCRINOLOGY | Facility: CLINIC | Age: 45
End: 2025-08-26
Payer: MEDICARE

## 2025-08-26 DIAGNOSIS — E10.65 UNCONTROLLED TYPE 1 DIABETES MELLITUS WITH HYPERGLYCEMIA, WITH LONG-TERM CURRENT USE OF INSULIN: ICD-10-CM

## 2025-08-26 RX ORDER — INSULIN GLARGINE-YFGN 100 [IU]/ML
INJECTION, SOLUTION SUBCUTANEOUS
Qty: 10 ML | Refills: 3 | Status: SHIPPED | OUTPATIENT
Start: 2025-08-26

## 2025-08-26 RX ORDER — SYRINGE AND NEEDLE,INSULIN,1ML 31GX15/64"
SYRINGE, EMPTY DISPOSABLE MISCELLANEOUS
Qty: 150 EACH | Refills: 3 | Status: SHIPPED | OUTPATIENT
Start: 2025-08-26

## 2025-08-28 DIAGNOSIS — E10.22 TYPE 1 DIABETES MELLITUS WITH STAGE 4 CHRONIC KIDNEY DISEASE (MULTI): ICD-10-CM

## 2025-08-28 DIAGNOSIS — N18.4 TYPE 1 DIABETES MELLITUS WITH STAGE 4 CHRONIC KIDNEY DISEASE (MULTI): ICD-10-CM

## 2025-08-28 RX ORDER — INSULIN DEGLUDEC 100 U/ML
INJECTION, SOLUTION SUBCUTANEOUS
Qty: 15 ML | Refills: 1 | Status: SHIPPED | OUTPATIENT
Start: 2025-08-28

## 2025-11-04 ENCOUNTER — APPOINTMENT (OUTPATIENT)
Dept: ENDOCRINOLOGY | Facility: CLINIC | Age: 45
End: 2025-11-04
Payer: MEDICARE

## 2025-12-15 ENCOUNTER — APPOINTMENT (OUTPATIENT)
Dept: NEPHROLOGY | Facility: CLINIC | Age: 45
End: 2025-12-15
Payer: MEDICARE